# Patient Record
Sex: FEMALE | Race: WHITE | Employment: OTHER | URBAN - METROPOLITAN AREA
[De-identification: names, ages, dates, MRNs, and addresses within clinical notes are randomized per-mention and may not be internally consistent; named-entity substitution may affect disease eponyms.]

---

## 2017-10-27 ENCOUNTER — APPOINTMENT (OUTPATIENT)
Dept: CT IMAGING | Age: 82
End: 2017-10-27
Attending: EMERGENCY MEDICINE
Payer: MEDICARE

## 2017-10-27 ENCOUNTER — HOSPITAL ENCOUNTER (EMERGENCY)
Age: 82
Discharge: HOME | End: 2017-10-27
Attending: EMERGENCY MEDICINE
Payer: MEDICARE

## 2017-10-27 ENCOUNTER — APPOINTMENT (OUTPATIENT)
Dept: GENERAL RADIOLOGY | Age: 82
End: 2017-10-27
Attending: EMERGENCY MEDICINE
Payer: MEDICARE

## 2017-10-27 VITALS
HEART RATE: 70 BPM | SYSTOLIC BLOOD PRESSURE: 126 MMHG | BODY MASS INDEX: 43.92 KG/M2 | WEIGHT: 223.7 LBS | OXYGEN SATURATION: 93 % | RESPIRATION RATE: 23 BRPM | TEMPERATURE: 98.2 F | DIASTOLIC BLOOD PRESSURE: 60 MMHG | HEIGHT: 60 IN

## 2017-10-27 DIAGNOSIS — N39.0 URINARY TRACT INFECTION WITHOUT HEMATURIA, SITE UNSPECIFIED: Primary | ICD-10-CM
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
## 2017-10-27 LAB
ALBUMIN SERPL-MCNC: 2.6 G/DL (ref 3.5–5)
ALBUMIN/GLOB SERPL: 0.5 {RATIO} (ref 1.1–2.2)
ALP SERPL-CCNC: 162 U/L (ref 45–117)
ALT SERPL-CCNC: 25 U/L (ref 12–78)
ANION GAP SERPL CALC-SCNC: 8 MMOL/L (ref 5–15)
APPEARANCE UR: ABNORMAL
AST SERPL-CCNC: 25 U/L (ref 15–37)
BACTERIA URNS QL MICRO: ABNORMAL /HPF
BASOPHILS # BLD: 0 K/UL (ref 0–0.1)
BASOPHILS NFR BLD: 0 % (ref 0–1)
BILIRUB SERPL-MCNC: 0.5 MG/DL (ref 0.2–1)
BILIRUB UR QL: NEGATIVE
BUN SERPL-MCNC: 37 MG/DL (ref 6–20)
BUN/CREAT SERPL: 24 (ref 12–20)
CALCIUM SERPL-MCNC: 9 MG/DL (ref 8.5–10.1)
CHLORIDE SERPL-SCNC: 97 MMOL/L (ref 97–108)
CO2 SERPL-SCNC: 27 MMOL/L (ref 21–32)
COLOR UR: ABNORMAL
CREAT SERPL-MCNC: 1.56 MG/DL (ref 0.55–1.02)
DIFFERENTIAL METHOD BLD: ABNORMAL
EOSINOPHIL # BLD: 0 K/UL (ref 0–0.4)
EOSINOPHIL NFR BLD: 0 % (ref 0–7)
EPITH CASTS URNS QL MICRO: ABNORMAL /LPF
ERYTHROCYTE [DISTWIDTH] IN BLOOD BY AUTOMATED COUNT: 12.6 % (ref 11.5–14.5)
GLOBULIN SER CALC-MCNC: 4.9 G/DL (ref 2–4)
GLUCOSE SERPL-MCNC: 267 MG/DL (ref 65–100)
GLUCOSE UR STRIP.AUTO-MCNC: NEGATIVE MG/DL
HCT VFR BLD AUTO: 39.6 % (ref 35–47)
HGB BLD-MCNC: 13.2 G/DL (ref 11.5–16)
HGB UR QL STRIP: ABNORMAL
KETONES UR QL STRIP.AUTO: NEGATIVE MG/DL
LEUKOCYTE ESTERASE UR QL STRIP.AUTO: ABNORMAL
LYMPHOCYTES # BLD: 0.7 K/UL (ref 0.8–3.5)
LYMPHOCYTES NFR BLD: 5 % (ref 12–49)
MCH RBC QN AUTO: 31.1 PG (ref 26–34)
MCHC RBC AUTO-ENTMCNC: 33.3 G/DL (ref 30–36.5)
MCV RBC AUTO: 93.4 FL (ref 80–99)
MONOCYTES # BLD: 1.3 K/UL (ref 0–1)
MONOCYTES NFR BLD: 9 % (ref 5–13)
NEUTS SEG # BLD: 12.2 K/UL (ref 1.8–8)
NEUTS SEG NFR BLD: 86 % (ref 32–75)
NITRITE UR QL STRIP.AUTO: POSITIVE
PH UR STRIP: 5.5 [PH] (ref 5–8)
PLATELET # BLD AUTO: 176 K/UL (ref 150–400)
POTASSIUM SERPL-SCNC: 3.9 MMOL/L (ref 3.5–5.1)
PROT SERPL-MCNC: 7.5 G/DL (ref 6.4–8.2)
PROT UR STRIP-MCNC: 30 MG/DL
RBC # BLD AUTO: 4.24 M/UL (ref 3.8–5.2)
RBC #/AREA URNS HPF: ABNORMAL /HPF (ref 0–5)
RBC MORPH BLD: ABNORMAL
SODIUM SERPL-SCNC: 132 MMOL/L (ref 136–145)
SP GR UR REFRACTOMETRY: 1.01 (ref 1–1.03)
UR CULT HOLD, URHOLD: NORMAL
UROBILINOGEN UR QL STRIP.AUTO: 1 EU/DL (ref 0.2–1)
WBC # BLD AUTO: 14.2 K/UL (ref 3.6–11)
WBC URNS QL MICRO: >100 /HPF (ref 0–4)

## 2017-10-27 PROCEDURE — 85025 COMPLETE CBC W/AUTO DIFF WBC: CPT | Performed by: EMERGENCY MEDICINE

## 2017-10-27 PROCEDURE — G8980 MOBILITY D/C STATUS: HCPCS

## 2017-10-27 PROCEDURE — 80053 COMPREHEN METABOLIC PANEL: CPT | Performed by: EMERGENCY MEDICINE

## 2017-10-27 PROCEDURE — 96365 THER/PROPH/DIAG IV INF INIT: CPT

## 2017-10-27 PROCEDURE — 74011250636 HC RX REV CODE- 250/636: Performed by: EMERGENCY MEDICINE

## 2017-10-27 PROCEDURE — 36415 COLL VENOUS BLD VENIPUNCTURE: CPT | Performed by: EMERGENCY MEDICINE

## 2017-10-27 PROCEDURE — 99285 EMERGENCY DEPT VISIT HI MDM: CPT

## 2017-10-27 PROCEDURE — 71010 XR CHEST PORT: CPT

## 2017-10-27 PROCEDURE — 51701 INSERT BLADDER CATHETER: CPT

## 2017-10-27 PROCEDURE — 97116 GAIT TRAINING THERAPY: CPT

## 2017-10-27 PROCEDURE — 77030005563 HC CATH URETH INT MMGH -A

## 2017-10-27 PROCEDURE — 97161 PT EVAL LOW COMPLEX 20 MIN: CPT

## 2017-10-27 PROCEDURE — 90791 PSYCH DIAGNOSTIC EVALUATION: CPT

## 2017-10-27 PROCEDURE — 70450 CT HEAD/BRAIN W/O DYE: CPT

## 2017-10-27 PROCEDURE — G8978 MOBILITY CURRENT STATUS: HCPCS

## 2017-10-27 PROCEDURE — 74011250637 HC RX REV CODE- 250/637: Performed by: EMERGENCY MEDICINE

## 2017-10-27 PROCEDURE — G8979 MOBILITY GOAL STATUS: HCPCS

## 2017-10-27 PROCEDURE — 74011000258 HC RX REV CODE- 258: Performed by: EMERGENCY MEDICINE

## 2017-10-27 PROCEDURE — 81001 URINALYSIS AUTO W/SCOPE: CPT | Performed by: EMERGENCY MEDICINE

## 2017-10-27 RX ORDER — CEFDINIR 300 MG/1
300 CAPSULE ORAL
Qty: 7 CAP | Refills: 0 | Status: SHIPPED | OUTPATIENT
Start: 2017-10-27 | End: 2021-01-01

## 2017-10-27 RX ORDER — ACETAMINOPHEN 325 MG/1
975 TABLET ORAL
Status: COMPLETED | OUTPATIENT
Start: 2017-10-27 | End: 2017-10-27

## 2017-10-27 RX ADMIN — ACETAMINOPHEN 975 MG: 325 TABLET ORAL at 13:51

## 2017-10-27 RX ADMIN — CEFTRIAXONE 1 G: 1 INJECTION, POWDER, FOR SOLUTION INTRAMUSCULAR; INTRAVENOUS at 12:42

## 2017-10-27 NOTE — ED NOTES
This RN called the patient's PCP to verify patient's allergies.  They are going to fax the most recent office note

## 2017-10-27 NOTE — PROGRESS NOTES
physical Therapy Emergency Department EVALUATION/DISCHARGE  Patient: Stephanie Carrizales (11 y.o. female)  Date: 10/27/2017  Primary Diagnosis: There are no admission diagnoses documented for this encounter. Precautions:      ASSESSMENT :  Chart reviewed. Patient presents from home with a UTI. Monday, patient reporting that she stood up with RW after getting out of bed and felt her legs get weak. She was able to slowly lower herself to the floor. No pain or injury from event. Based on the objective data described below, the patient presents approaching functional baseline with general debility. Patient needing additional time to come to the EOB. Patient leaning posteriorly, but able to correct with UEs. Once feet on the floor, good sitting balance. Patient sit to stand, CGA with RW. Patient ambulating with a steady gait pattern out on the hallway. No weakness, buckling or pain reported. Suspect that patient is approaching functional baseline. Advised patient to stand EOB, march in place etc.. Prior to walking away from the bed to insure that she does not experience another episode of weakness. Discussed the consequences of a fall at her age. Patient and family member agree. Patient is not home alone very often and has good support and assist as needed. Recommend home with family upon discharge. Further acute physical therapy is not indicated at this time. PLAN :  Discharge Recommendations:     [x]   Home with family  []   Skilled nursing facility  []   Admission to hospital with rehab likely needed  []   Inpatient rehab referral  []   Outpatient physical therapy referral  []   Other:    Further Equipment Recommendations for Discharge: none, has RW at home  []   Rolling walker with 5\" wheels  []   Crutches   []   [address]   []   Wheelchair   []   Other:     COMMUNICATION/EDUCATION:   Communication/Collaboration:  [x]   Fall prevention education was provided and the patient/caregiver indicated understanding.   [x] Patient/family have participated as able and agree with findings and recommendations. []   Patient is unable to participate in plan of care at this time. Findings and recommendations were discussed with: MD physician and Registered Nurse       SUBJECTIVE:   Patient stated These aren't high heels! They are wedges.     OBJECTIVE DATA SUMMARY:   HISTORY:    Past Medical History:   Diagnosis Date    Diabetes ([facility] [address]     High cholesterol     Hypertension      Past Surgical History:   Procedure Laterality Date    HX HYSTERECTOMY      HX ORTHOPAEDIC      bilateral rods in femurs    HX PACEMAKER       Prior Level of Function/Home Situation: Lives with granddaughter and her  and great grandchild (23 y.o). Patient is rarely home alone. At baseline, patient ambulates within the house with RW with distant supervision. Patient fell many year ago and broke her left hip. No falls since. Personal factors and/or comorbidities impacting plan of care:     Home Situation  Home Environment: Private residence  # Steps to Enter: 0  One/Two Story Residence: One story  Living Alone: No  Support Systems: Child(brii), Family member(s)  Patient Expects to be Discharged to[de-identified] Patient room  Current DME Used/Available at Home: Walker, rolling    EXAMINATION/PRESENTATION/DECISION MAKING:   Critical Behavior:  Neurologic State: Alert, Confused  Orientation Level: Disoriented to time, Oriented to person, Oriented to place, Oriented to situation  Cognition: Appropriate for age attention/concentration, Appropriate decision making, Appropriate safety awareness, Follows commands     Hearing:   Auditory  Auditory Impairment: Hard of hearing, bilateral    Strength:    Strength: Generally decreased, functional     Tone & Sensation:   Tone: Normal  Sensation: Intact      Coordination:  Coordination: Generally decreased, functional    Functional Mobility:  Bed Mobility:  Supine to Sit: Stand-by asssistance     Transfers:  Sit to Stand: Contact guard assistance  Stand to Sit: Contact guard assistance     Balance:   Sitting: Intact  Standing: Intact; With support  Ambulation/Gait Training:  Distance (ft): 100 Feet (ft)  Assistive Device: Gait belt;Walker, rolling  Ambulation - Level of Assistance: Stand-by asssistance  Gait Abnormalities: Decreased step clearance;Trunk sway increased (forward flexion')  Base of Support: Widened  Speed/[place]: Slow  Step Length: Right shortened;Left shortened    Special Tests:  10 Meter walk test:  (Specify if any supplemental oxygen is used, the type, pre, during and post sats.)    Self-Selected Or Fast-Velocity: Self Selected Velocity  Trial 1: 14.3 Seconds  Trial 2: 14.3 Seconds  Trial 3: 14.3 Seconds      Gait speed: 0.70 m/s             Walking Speed (m/s)  Modifier Scale Age 52-63 Age 61-76 Age 66-77 Age 80-80    Male Female Male Female Male Female Male Female   CH   0% Impaired ? 1.39 ? 1.40 ? 1.36 ? 1.30 ? 1.33 ? 1.27 ? 1.21 ? 1.15   CI   1-19% Impaired 1.11-1.38 1.12-1.39 1.09-1.35 1.04-1.29 1.06-1.32 1.01-1.26 0.96-1.20 0.92-1.14   CJ   20-39% Impaired 0.83-1.10 0.84-1.11 0.82-1.08 0.78-1.03 0.80-1.05 0.76-1.00 0.72-0.95 0.69-0.91   CK   40-59% Impaired 0.56-0.82 0.57-0.83 0.54-0.81 0.52-0.77 0.53-0.79 0.51-0.75 0.48-0.71 0.46-0.68   CL   60-79% Impaired 0.28-0.55 0.28-0.56 0.27-0.53 0.26-0.51 0.27-0.52 0.25-0.50 0.24-0.49 0.23-0.45   CM   80-99% Impaired 0.01-0.28 < 0.01-0.28 < 0.01-0.27 < 0.01-0.26 0.01-0.27 0.01-0.24 0.01-0.23 0.01-0.22   CN   100% Impaired Cannot Perform   Minimal Detectable Change (MDC-90) = 0.1 m/s  Shoaib HENDRICKSON \"Comfortable and maximum walking speed of adults aged 20-79 years: reference values and determinants. \" Age and Agin Volume 26(1):15-9. Benigno Diaz. \"Age- and gender-related test performance in community-dwelling elderly people: Six-Minute Walk Test, [place] Balance Scale, Timed Up & Go Test, and gait speeds. \" Physical Therapy: 2002 Volume 82(2):128-37. Toni Leung DM, Dee PW, Bailey Espinosa NICCI, [facility] CONSTANTINE. \"Assessing stability and change of four performance measures: a longitudinal study evaluating outcome following total hip and knee arthroplasty. \" [facility] Musculoskeletal Disorders: 2005 Volume 6(3). Sachin Reyes, PhD; Dale Delgadillo, PhD. Marysol Fitch Paper: \"Walking Speed: the Sixth Vital Sign\" Journal of Geriatric Physical Therapy: 2009 - Volume 32 - Issue 2 - p 25 . In compliance with CMSs Claims Based Outcome Reporting, the following G-code set was chosen for this patient based on their primary functional limitation being treated: The outcome measure chosen to determine the severity of the functional limitation was the 10 MWT with a score of 0.70 m/s which was correlated with the impairment scale. ? Mobility - Walking and Moving Around:     - CURRENT STATUS: CJ - 20%-39% impaired, limited or restricted    - GOAL STATUS: CJ - 20%-39% impaired, limited or restricted    - D/C STATUS:  CJ - 20%-39% impaired, limited or restricted        Pain:Pain Scale 1: Numeric (0 - 10)  Pain Intensity 1: 0     Activity Tolerance:   WFL, minimal RUSSELL with activity. Vitals stable. O2>90% on RA  Please refer to the flowsheet for vital signs taken during this treatment.   After treatment:   []         Patient left in no apparent distress sitting up in chair  [x]         Patient left in no apparent distress in bed  [x]         Call bell left within reach  [x]         Nursing notified  [x]         Caregiver present  []         Bed alarm activated        Thank you for this referral.  Merlene Frazier PT, DPT   Time Calculation: 21 mins

## 2017-10-27 NOTE — ED PROVIDER NOTES
HPI Comments: 80 y.o. female with past medical history significant for DM, HTN, hypercholesterolemia, and s/p PM and hysterectomy who presents from home via private vehicle with chief complaint of urinary frequency. Pt lives with her granddaughter who notes that the pt slid to the ground from bed when trying to transfer to her walker 3 days ago. Pt reports that she was stuck on the floor for four hours unable to call to her family before she was found and assisted up. Pt denies any pain or injury from the fall and did not hit her head, but does note that she is having increased difficulty walking. Granddaughter notes that the pt's speech was somewhat slurred after the fall but seemed to improve through Wednesday as she talked more. Pt reports some SOB when she \"does too much\" - which the granddaughter notes can be just walking from the bed to the bathroom. Pt c/o increased urinary frequency but denies diarrhea. Lastly, she notes some chronic numbness of her left hand. +Leg swelling. Outside of the room, granddaughter notes that the pt \"seems more alert than she is\" and \"is a good actress. \" Felicia Villafana has not h/o stroke. There are no other acute medical concerns at this time. Social hx: Never smoker. No alcohol use. Note written by Brianna Montes, as dictated by Adela Vences MD 10:09 AM      The history is provided by the patient. No  was used. Past Medical History:   Diagnosis Date    Diabetes ([address]     High cholesterol     Hypertension        Past Surgical History:   Procedure Laterality Date    HX HYSTERECTOMY      HX ORTHOPAEDIC      bilateral rods in femurs    HX PACEMAKER           No family history on file. Social History     Social History    Marital status:      Spouse name: N/A    Number of children: N/A    Years of education: N/A     Occupational History    Not on file.      Social History Main Topics    Smoking status: Never Smoker    Smokeless tobacco: Never Used    Alcohol use No    Drug use: Not on file    Sexual activity: Not on file     Other Topics Concern    Not on file     Social History Narrative    No narrative on file         ALLERGIES: Hydrocodone    Review of Systems   Constitutional: Negative for activity change, appetite change and fatigue. HENT: Negative for ear pain, facial swelling, sore throat and trouble swallowing. Eyes: Negative for pain, discharge and visual disturbance. Respiratory: Negative for chest tightness, shortness of breath and wheezing. Cardiovascular: Positive for leg swelling. Negative for chest pain and palpitations. Gastrointestinal: Negative for abdominal pain, blood in stool and nausea. Genitourinary: Positive for frequency. Negative for difficulty urinating, dysuria, flank pain and hematuria. Musculoskeletal: Positive for gait problem. Negative for arthralgias, joint swelling, myalgias and neck pain. Skin: Negative for color change and rash. Neurological: Positive for speech difficulty (slurred3 days ago. ). Negative for weakness, numbness and headaches. Hematological: Negative for adenopathy. Does not bruise/bleed easily. Psychiatric/Behavioral: Positive for confusion. Negative for behavioral problems and sleep disturbance. All other systems reviewed and are negative. Vitals:    10/27/17 0929 10/27/17 0945   BP: 112/81    Pulse: 67    Resp: 21    SpO2: 96% 93%   Weight: 101.5 kg (223 lb 11.2 oz)    Height: 5' (1.524 m)             Physical Exam   Constitutional: She is oriented to person, place, and time. She appears well-developed. No distress. Obese   HENT:   Head: Normocephalic and atraumatic. Nose: Nose normal.   Mouth/Throat: Oropharynx is clear and moist.   Eyes: Conjunctivae and EOM are normal. Pupils are equal, round, and reactive to light. No scleral icterus. Neck: Normal range of motion. Neck supple. No JVD present. No tracheal deviation present.  No thyromegaly present. No carotid bruits noted. Cardiovascular: Normal rate, regular rhythm, normal heart sounds and intact distal pulses. Exam reveals no gallop and no friction rub. No murmur heard. Pulmonary/Chest: Effort normal and breath sounds normal. No respiratory distress. She has no wheezes. She has no rales. She exhibits no tenderness. Abdominal: Soft. Bowel sounds are normal. She exhibits no distension and no mass. There is no tenderness. There is no rebound and no guarding. Musculoskeletal: Normal range of motion. She exhibits no edema or tenderness. Lymphadenopathy:     She has no cervical adenopathy. Neurological: She is alert and oriented to person, place, and time. She has normal reflexes. No cranial nerve deficit. Coordination normal.   Skin: Skin is warm and dry. No rash noted. No erythema. Psychiatric: She has a normal mood and affect. Her behavior is normal. Judgment and thought content normal.   Nursing note and vitals reviewed.      Note written by Brianna Camara, as dictated by Alvin Boyd MD 10:11 AM      MDM  Number of Diagnoses or Management Options  Urinary tract infection without hematuria, site unspecified: new and requires workup     Amount and/or Complexity of Data Reviewed  Clinical lab tests: ordered and reviewed  Tests in the radiology section of CPT®: ordered and reviewed  Decide to obtain previous medical records or to obtain history from someone other than the patient: yes  Obtain history from someone other than the patient: yes  Review and summarize past medical records: yes  Discuss the patient with other providers: yes  Independent visualization of images, tracings, or specimens: yes    Risk of Complications, Morbidity, and/or Mortality  Presenting problems: high  Diagnostic procedures: high  Management options: high    Patient Progress  Patient progress: stable    ED Course       Procedures      PROGRESS NOTE:  11:58 AM  Urinalysis is positive for signs of UTI.     12:12 PM  Rakan Taveras MD had pharmacy look at dosing ABx. Will give omnicef 300 mg once daily. 12:19 PM  Patient's results have been reviewed with them. Patient and/or family have verbally conveyed their understanding and agreement of the patient's signs, symptoms, diagnosis, treatment and prognosis and additionally agree to follow up as recommended or return to the Emergency Room should their condition change prior to follow-up. Discharge instructions have also been provided to the patient with some educational information regarding their diagnosis as well a list of reasons why they would want to return to the ER prior to their follow-up appointment should their condition change. Pt will be discharged when her treatment is complete.  Believe patient's current symptoms are related to her UTI

## 2017-10-27 NOTE — ED NOTES
Spoke with Dr. Sandra Bran about patient's temperature and he gave verbal orders to administer Tylenol prior to discharge

## 2017-10-27 NOTE — Clinical Note
Home to rest and stay well hydrated. Medications as directed for the infection and follow up with own MD in the next 3-4 days if continued difficulty or no improvement/worsening of symptoms.

## 2017-10-27 NOTE — ED TRIAGE NOTES
CARLOS A NOTE: Per family, patient has not been feeling well since Tuesday. Family suspicious of UTI due to increase confusion. Family continues to report that patient slipped out of bed Tuesday and experienced slurred speech for a short period of time and dizziness and vomiting. Glucose was high (178) and BP was low (76/48) at that time also.

## 2017-10-27 NOTE — ED NOTES
I have reviewed discharge instructions with the patient and caregiver. The patient and caregiver verbalized understanding. Pt wheeled out of the dept via wheelchair.

## 2021-01-01 ENCOUNTER — APPOINTMENT (OUTPATIENT)
Dept: CT IMAGING | Age: 86
DRG: 637 | End: 2021-01-01
Attending: EMERGENCY MEDICINE
Payer: MEDICARE

## 2021-01-01 ENCOUNTER — APPOINTMENT (OUTPATIENT)
Dept: NON INVASIVE DIAGNOSTICS | Age: 86
DRG: 637 | End: 2021-01-01
Attending: INTERNAL MEDICINE
Payer: MEDICARE

## 2021-01-01 ENCOUNTER — APPOINTMENT (OUTPATIENT)
Dept: GENERAL RADIOLOGY | Age: 86
DRG: 637 | End: 2021-01-01
Attending: EMERGENCY MEDICINE
Payer: MEDICARE

## 2021-01-01 ENCOUNTER — HOSPITAL ENCOUNTER (INPATIENT)
Age: 86
LOS: 2 days | Discharge: HOME HEALTH | DRG: 637 | End: 2021-12-10
Attending: EMERGENCY MEDICINE | Admitting: FAMILY MEDICINE
Payer: MEDICARE

## 2021-01-01 VITALS
SYSTOLIC BLOOD PRESSURE: 129 MMHG | WEIGHT: 219.36 LBS | BODY MASS INDEX: 44.22 KG/M2 | TEMPERATURE: 97.7 F | HEIGHT: 59 IN | OXYGEN SATURATION: 97 % | HEART RATE: 63 BPM | DIASTOLIC BLOOD PRESSURE: 59 MMHG | RESPIRATION RATE: 18 BRPM

## 2021-01-01 DIAGNOSIS — I63.9 CEREBROVASCULAR ACCIDENT (CVA), UNSPECIFIED MECHANISM (HCC): Primary | ICD-10-CM

## 2021-01-01 DIAGNOSIS — Q67.0 FACIAL ASYMMETRY: ICD-10-CM

## 2021-01-01 DIAGNOSIS — D72.829 LEUKOCYTOSIS, UNSPECIFIED TYPE: ICD-10-CM

## 2021-01-01 DIAGNOSIS — R41.4 LEFT-SIDED NEGLECT: ICD-10-CM

## 2021-01-01 DIAGNOSIS — R11.10 VOMITING, INTRACTABILITY OF VOMITING NOT SPECIFIED, PRESENCE OF NAUSEA NOT SPECIFIED, UNSPECIFIED VOMITING TYPE: ICD-10-CM

## 2021-01-01 DIAGNOSIS — E16.2 HYPOGLYCEMIA: ICD-10-CM

## 2021-01-01 LAB
ALBUMIN SERPL-MCNC: 3.6 G/DL (ref 3.5–5)
ALBUMIN/GLOB SERPL: 0.7 {RATIO} (ref 1.1–2.2)
ALP SERPL-CCNC: 91 U/L (ref 45–117)
ALT SERPL-CCNC: 48 U/L (ref 12–78)
ANION GAP SERPL CALC-SCNC: 3 MMOL/L (ref 5–15)
ANION GAP SERPL CALC-SCNC: 3 MMOL/L (ref 5–15)
APPEARANCE UR: ABNORMAL
AST SERPL-CCNC: 51 U/L (ref 15–37)
ATRIAL RATE: 87 BPM
BACTERIA SPEC CULT: ABNORMAL
BACTERIA URNS QL MICRO: ABNORMAL /HPF
BASOPHILS # BLD: 0 K/UL (ref 0–0.1)
BASOPHILS NFR BLD: 0 % (ref 0–1)
BILIRUB SERPL-MCNC: 0.3 MG/DL (ref 0.2–1)
BILIRUB UR QL: NEGATIVE
BNP SERPL-MCNC: 4781 PG/ML
BUN SERPL-MCNC: 26 MG/DL (ref 6–20)
BUN SERPL-MCNC: 26 MG/DL (ref 6–20)
BUN/CREAT SERPL: 22 (ref 12–20)
BUN/CREAT SERPL: 22 (ref 12–20)
CALCIUM SERPL-MCNC: 10 MG/DL (ref 8.5–10.1)
CALCIUM SERPL-MCNC: 9.4 MG/DL (ref 8.5–10.1)
CALCULATED P AXIS, ECG09: 29 DEGREES
CALCULATED R AXIS, ECG10: 57 DEGREES
CALCULATED T AXIS, ECG11: -144 DEGREES
CC UR VC: ABNORMAL
CHLORIDE SERPL-SCNC: 105 MMOL/L (ref 97–108)
CHLORIDE SERPL-SCNC: 107 MMOL/L (ref 97–108)
CHOLEST SERPL-MCNC: 166 MG/DL
CO2 SERPL-SCNC: 25 MMOL/L (ref 21–32)
CO2 SERPL-SCNC: 29 MMOL/L (ref 21–32)
COLOR UR: ABNORMAL
COMMENT, HOLDF: NORMAL
COVID-19 RAPID TEST, COVR: NOT DETECTED
CREAT SERPL-MCNC: 1.16 MG/DL (ref 0.55–1.02)
CREAT SERPL-MCNC: 1.2 MG/DL (ref 0.55–1.02)
CRP SERPL HS-MCNC: >9.5 MG/L
DIAGNOSIS, 93000: NORMAL
DIFFERENTIAL METHOD BLD: ABNORMAL
ECHO AV AREA PEAK VELOCITY: 0.96 CM2
ECHO AV AREA VTI: 1.15 CM2
ECHO AV AREA/BSA PEAK VELOCITY: 0.5 CM2/M2
ECHO AV AREA/BSA VTI: 0.6 CM2/M2
ECHO AV MEAN GRADIENT: 22.02 MMHG
ECHO AV PEAK GRADIENT: 44.48 MMHG
ECHO AV PEAK VELOCITY: 333.48 CM/S
ECHO AV VTI: 56.01 CM
ECHO LVOT DIAM: 1.87 CM
ECHO LVOT PEAK GRADIENT: 5.43 MMHG
ECHO LVOT PEAK VELOCITY: 116.5 CM/S
ECHO LVOT SV: 64.6 ML
ECHO LVOT VTI: 23.53 CM
ECHO MV A VELOCITY: 83.28 CM/S
ECHO MV AREA PHT: 3.39 CM2
ECHO MV AREA VTI: 2.74 CM2
ECHO MV MAX VELOCITY: 100.03 CM/S
ECHO MV MEAN GRADIENT: 1.73 MMHG
ECHO MV PEAK GRADIENT: 4 MMHG
ECHO MV PRESSURE HALF TIME (PHT): 64.88 MS
ECHO MV VTI: 23.56 CM
EOSINOPHIL # BLD: 0 K/UL (ref 0–0.4)
EOSINOPHIL NFR BLD: 0 % (ref 0–7)
EPITH CASTS URNS QL MICRO: ABNORMAL /LPF
ERYTHROCYTE [DISTWIDTH] IN BLOOD BY AUTOMATED COUNT: 12.6 % (ref 11.5–14.5)
ERYTHROCYTE [DISTWIDTH] IN BLOOD BY AUTOMATED COUNT: 13 % (ref 11.5–14.5)
ERYTHROCYTE [SEDIMENTATION RATE] IN BLOOD: 11 MM/HR (ref 0–30)
EST. AVERAGE GLUCOSE BLD GHB EST-MCNC: 123 MG/DL
EST. AVERAGE GLUCOSE BLD GHB EST-MCNC: 131 MG/DL
GLOBULIN SER CALC-MCNC: 4.9 G/DL (ref 2–4)
GLUCOSE BLD STRIP.AUTO-MCNC: 101 MG/DL (ref 65–117)
GLUCOSE BLD STRIP.AUTO-MCNC: 116 MG/DL (ref 65–117)
GLUCOSE BLD STRIP.AUTO-MCNC: 141 MG/DL (ref 65–117)
GLUCOSE BLD STRIP.AUTO-MCNC: 143 MG/DL (ref 65–117)
GLUCOSE BLD STRIP.AUTO-MCNC: 154 MG/DL (ref 65–117)
GLUCOSE BLD STRIP.AUTO-MCNC: 159 MG/DL (ref 65–117)
GLUCOSE BLD STRIP.AUTO-MCNC: 162 MG/DL (ref 65–117)
GLUCOSE BLD STRIP.AUTO-MCNC: 191 MG/DL (ref 65–117)
GLUCOSE BLD STRIP.AUTO-MCNC: 203 MG/DL (ref 65–117)
GLUCOSE BLD STRIP.AUTO-MCNC: 36 MG/DL (ref 65–117)
GLUCOSE BLD STRIP.AUTO-MCNC: 49 MG/DL (ref 65–117)
GLUCOSE BLD STRIP.AUTO-MCNC: 64 MG/DL (ref 65–117)
GLUCOSE BLD STRIP.AUTO-MCNC: 75 MG/DL (ref 65–117)
GLUCOSE BLD STRIP.AUTO-MCNC: 80 MG/DL (ref 65–117)
GLUCOSE BLD STRIP.AUTO-MCNC: 85 MG/DL (ref 65–117)
GLUCOSE BLD STRIP.AUTO-MCNC: 87 MG/DL (ref 65–117)
GLUCOSE SERPL-MCNC: 72 MG/DL (ref 65–100)
GLUCOSE SERPL-MCNC: 89 MG/DL (ref 65–100)
GLUCOSE UR STRIP.AUTO-MCNC: 250 MG/DL
HBA1C MFR BLD: 5.9 % (ref 4–5.6)
HBA1C MFR BLD: 6.2 % (ref 4–5.6)
HCT VFR BLD AUTO: 44.4 % (ref 35–47)
HCT VFR BLD AUTO: 55.1 % (ref 35–47)
HDLC SERPL-MCNC: 77 MG/DL
HDLC SERPL: 2.2 {RATIO} (ref 0–5)
HGB BLD-MCNC: 14.5 G/DL (ref 11.5–16)
HGB BLD-MCNC: 18.1 G/DL (ref 11.5–16)
HGB UR QL STRIP: ABNORMAL
HYALINE CASTS URNS QL MICRO: ABNORMAL /LPF (ref 0–5)
IMM GRANULOCYTES # BLD AUTO: 0.1 K/UL (ref 0–0.04)
IMM GRANULOCYTES NFR BLD AUTO: 1 % (ref 0–0.5)
INR PPP: 1 (ref 0.9–1.1)
KETONES UR QL STRIP.AUTO: NEGATIVE MG/DL
LDLC SERPL CALC-MCNC: 67.2 MG/DL (ref 0–100)
LEUKOCYTE ESTERASE UR QL STRIP.AUTO: NEGATIVE
LYMPHOCYTES # BLD: 1.1 K/UL (ref 0.8–3.5)
LYMPHOCYTES NFR BLD: 6 % (ref 12–49)
MCH RBC QN AUTO: 31.7 PG (ref 26–34)
MCH RBC QN AUTO: 31.8 PG (ref 26–34)
MCHC RBC AUTO-ENTMCNC: 32.7 G/DL (ref 30–36.5)
MCHC RBC AUTO-ENTMCNC: 32.8 G/DL (ref 30–36.5)
MCV RBC AUTO: 96.7 FL (ref 80–99)
MCV RBC AUTO: 96.9 FL (ref 80–99)
MONOCYTES # BLD: 0.9 K/UL (ref 0–1)
MONOCYTES NFR BLD: 5 % (ref 5–13)
NEUTS SEG # BLD: 16 K/UL (ref 1.8–8)
NEUTS SEG NFR BLD: 88 % (ref 32–75)
NITRITE UR QL STRIP.AUTO: NEGATIVE
NRBC # BLD: 0 K/UL (ref 0–0.01)
NRBC # BLD: 0 K/UL (ref 0–0.01)
NRBC BLD-RTO: 0 PER 100 WBC
NRBC BLD-RTO: 0 PER 100 WBC
P-R INTERVAL, ECG05: 258 MS
PH UR STRIP: 7 [PH] (ref 5–8)
PLATELET # BLD AUTO: 168 K/UL (ref 150–400)
PLATELET # BLD AUTO: 225 K/UL (ref 150–400)
PMV BLD AUTO: 10.5 FL (ref 8.9–12.9)
PMV BLD AUTO: 10.9 FL (ref 8.9–12.9)
POTASSIUM SERPL-SCNC: 3.6 MMOL/L (ref 3.5–5.1)
POTASSIUM SERPL-SCNC: ABNORMAL MMOL/L (ref 3.5–5.1)
PROT SERPL-MCNC: 8.5 G/DL (ref 6.4–8.2)
PROT UR STRIP-MCNC: >300 MG/DL
PROTHROMBIN TIME: 10.9 SEC (ref 9–11.1)
Q-T INTERVAL, ECG07: 388 MS
QRS DURATION, ECG06: 84 MS
QTC CALCULATION (BEZET), ECG08: 466 MS
RBC # BLD AUTO: 4.58 M/UL (ref 3.8–5.2)
RBC # BLD AUTO: 5.7 M/UL (ref 3.8–5.2)
RBC #/AREA URNS HPF: ABNORMAL /HPF (ref 0–5)
SAMPLES BEING HELD,HOLD: NORMAL
SERVICE CMNT-IMP: ABNORMAL
SERVICE CMNT-IMP: NORMAL
SODIUM SERPL-SCNC: 135 MMOL/L (ref 136–145)
SODIUM SERPL-SCNC: 137 MMOL/L (ref 136–145)
SOURCE, COVRS: NORMAL
SP GR UR REFRACTOMETRY: 1.01
TRIGL SERPL-MCNC: 109 MG/DL (ref ?–150)
TROPONIN-HIGH SENSITIVITY: 408 NG/L (ref 0–51)
TROPONIN-HIGH SENSITIVITY: 477 NG/L (ref 0–51)
TROPONIN-HIGH SENSITIVITY: 759 NG/L (ref 0–51)
TSH SERPL DL<=0.05 MIU/L-ACNC: 0.7 UIU/ML (ref 0.36–3.74)
UR CULT HOLD, URHOLD: NORMAL
UROBILINOGEN UR QL STRIP.AUTO: 0.2 EU/DL (ref 0.2–1)
VENTRICULAR RATE, ECG03: 87 BPM
VLDLC SERPL CALC-MCNC: 21.8 MG/DL
WBC # BLD AUTO: 17.6 K/UL (ref 3.6–11)
WBC # BLD AUTO: 18.2 K/UL (ref 3.6–11)
WBC URNS QL MICRO: ABNORMAL /HPF (ref 0–4)

## 2021-01-01 PROCEDURE — 92610 EVALUATE SWALLOWING FUNCTION: CPT | Performed by: SPEECH-LANGUAGE PATHOLOGIST

## 2021-01-01 PROCEDURE — 80048 BASIC METABOLIC PNL TOTAL CA: CPT

## 2021-01-01 PROCEDURE — 95816 EEG AWAKE AND DROWSY: CPT | Performed by: PSYCHIATRY & NEUROLOGY

## 2021-01-01 PROCEDURE — 97161 PT EVAL LOW COMPLEX 20 MIN: CPT

## 2021-01-01 PROCEDURE — 99285 EMERGENCY DEPT VISIT HI MDM: CPT

## 2021-01-01 PROCEDURE — 90686 IIV4 VACC NO PRSV 0.5 ML IM: CPT | Performed by: HOSPITALIST

## 2021-01-01 PROCEDURE — 97530 THERAPEUTIC ACTIVITIES: CPT

## 2021-01-01 PROCEDURE — 87635 SARS-COV-2 COVID-19 AMP PRB: CPT

## 2021-01-01 PROCEDURE — 74011250636 HC RX REV CODE- 250/636: Performed by: EMERGENCY MEDICINE

## 2021-01-01 PROCEDURE — 70498 CT ANGIOGRAPHY NECK: CPT

## 2021-01-01 PROCEDURE — 74011250637 HC RX REV CODE- 250/637: Performed by: HOSPITALIST

## 2021-01-01 PROCEDURE — 87186 SC STD MICRODIL/AGAR DIL: CPT

## 2021-01-01 PROCEDURE — 74011250636 HC RX REV CODE- 250/636: Performed by: HOSPITALIST

## 2021-01-01 PROCEDURE — 85027 COMPLETE CBC AUTOMATED: CPT

## 2021-01-01 PROCEDURE — 95816 EEG AWAKE AND DROWSY: CPT | Performed by: FAMILY MEDICINE

## 2021-01-01 PROCEDURE — 93306 TTE W/DOPPLER COMPLETE: CPT

## 2021-01-01 PROCEDURE — 96374 THER/PROPH/DIAG INJ IV PUSH: CPT

## 2021-01-01 PROCEDURE — 74011000250 HC RX REV CODE- 250: Performed by: FAMILY MEDICINE

## 2021-01-01 PROCEDURE — 90471 IMMUNIZATION ADMIN: CPT

## 2021-01-01 PROCEDURE — 74176 CT ABD & PELVIS W/O CONTRAST: CPT

## 2021-01-01 PROCEDURE — 80061 LIPID PANEL: CPT

## 2021-01-01 PROCEDURE — 74011000250 HC RX REV CODE- 250

## 2021-01-01 PROCEDURE — 70450 CT HEAD/BRAIN W/O DYE: CPT

## 2021-01-01 PROCEDURE — 96375 TX/PRO/DX INJ NEW DRUG ADDON: CPT

## 2021-01-01 PROCEDURE — 94762 N-INVAS EAR/PLS OXIMTRY CONT: CPT

## 2021-01-01 PROCEDURE — 87077 CULTURE AEROBIC IDENTIFY: CPT

## 2021-01-01 PROCEDURE — 74011250637 HC RX REV CODE- 250/637: Performed by: FAMILY MEDICINE

## 2021-01-01 PROCEDURE — 99223 1ST HOSP IP/OBS HIGH 75: CPT | Performed by: PSYCHIATRY & NEUROLOGY

## 2021-01-01 PROCEDURE — 82962 GLUCOSE BLOOD TEST: CPT

## 2021-01-01 PROCEDURE — 83036 HEMOGLOBIN GLYCOSYLATED A1C: CPT

## 2021-01-01 PROCEDURE — 93005 ELECTROCARDIOGRAM TRACING: CPT

## 2021-01-01 PROCEDURE — 97165 OT EVAL LOW COMPLEX 30 MIN: CPT

## 2021-01-01 PROCEDURE — 36415 COLL VENOUS BLD VENIPUNCTURE: CPT

## 2021-01-01 PROCEDURE — 74011250636 HC RX REV CODE- 250/636: Performed by: FAMILY MEDICINE

## 2021-01-01 PROCEDURE — 85610 PROTHROMBIN TIME: CPT

## 2021-01-01 PROCEDURE — 74011000258 HC RX REV CODE- 258: Performed by: FAMILY MEDICINE

## 2021-01-01 PROCEDURE — 71045 X-RAY EXAM CHEST 1 VIEW: CPT

## 2021-01-01 PROCEDURE — 84484 ASSAY OF TROPONIN QUANT: CPT

## 2021-01-01 PROCEDURE — 65660000000 HC RM CCU STEPDOWN

## 2021-01-01 PROCEDURE — 87086 URINE CULTURE/COLONY COUNT: CPT

## 2021-01-01 PROCEDURE — 84443 ASSAY THYROID STIM HORMONE: CPT

## 2021-01-01 PROCEDURE — 77030038269 HC DRN EXT URIN PURWCK BARD -A

## 2021-01-01 PROCEDURE — 86141 C-REACTIVE PROTEIN HS: CPT

## 2021-01-01 PROCEDURE — 0042T CT CODE NEURO PERF W CBF: CPT

## 2021-01-01 PROCEDURE — 83880 ASSAY OF NATRIURETIC PEPTIDE: CPT

## 2021-01-01 PROCEDURE — 85025 COMPLETE CBC W/AUTO DIFF WBC: CPT

## 2021-01-01 PROCEDURE — 4A03X5D MEASUREMENT OF ARTERIAL FLOW, INTRACRANIAL, EXTERNAL APPROACH: ICD-10-PCS | Performed by: HOSPITALIST

## 2021-01-01 PROCEDURE — 81001 URINALYSIS AUTO W/SCOPE: CPT

## 2021-01-01 PROCEDURE — 74011000636 HC RX REV CODE- 636: Performed by: RADIOLOGY

## 2021-01-01 PROCEDURE — 85652 RBC SED RATE AUTOMATED: CPT

## 2021-01-01 PROCEDURE — 80053 COMPREHEN METABOLIC PANEL: CPT

## 2021-01-01 RX ORDER — ONDANSETRON 2 MG/ML
4 INJECTION INTRAMUSCULAR; INTRAVENOUS ONCE
Status: ACTIVE | OUTPATIENT
Start: 2021-01-01 | End: 2021-01-01

## 2021-01-01 RX ORDER — DEXTROMETHORPHAN POLISTIREX 30 MG/5 ML
SUSPENSION, EXTENDED RELEASE 12 HR ORAL
Status: DISCONTINUED | OUTPATIENT
Start: 2021-01-01 | End: 2021-01-01

## 2021-01-01 RX ORDER — SOTALOL HYDROCHLORIDE 80 MG/1
40 TABLET ORAL DAILY
COMMUNITY

## 2021-01-01 RX ORDER — ASPIRIN 81 MG/1
81 TABLET ORAL DAILY
Qty: 30 TABLET | Refills: 11 | Status: SHIPPED | OUTPATIENT
Start: 2021-01-01

## 2021-01-01 RX ORDER — ASPIRIN 81 MG/1
81 TABLET ORAL DAILY
Status: DISCONTINUED | OUTPATIENT
Start: 2021-01-01 | End: 2021-01-01 | Stop reason: HOSPADM

## 2021-01-01 RX ORDER — INSULIN ASPART 100 [IU]/ML
INJECTION, SOLUTION INTRAVENOUS; SUBCUTANEOUS
COMMUNITY

## 2021-01-01 RX ORDER — ACETAMINOPHEN 325 MG/1
650 TABLET ORAL
Status: DISCONTINUED | OUTPATIENT
Start: 2021-01-01 | End: 2021-01-01 | Stop reason: HOSPADM

## 2021-01-01 RX ORDER — LISINOPRIL 20 MG/1
40 TABLET ORAL DAILY
Status: DISCONTINUED | OUTPATIENT
Start: 2021-01-01 | End: 2021-01-01 | Stop reason: HOSPADM

## 2021-01-01 RX ORDER — ONDANSETRON 2 MG/ML
4 INJECTION INTRAMUSCULAR; INTRAVENOUS
Status: DISCONTINUED | OUTPATIENT
Start: 2021-01-01 | End: 2021-01-01 | Stop reason: HOSPADM

## 2021-01-01 RX ORDER — INSULIN LISPRO 100 [IU]/ML
INJECTION, SOLUTION INTRAVENOUS; SUBCUTANEOUS EVERY 6 HOURS
Status: DISCONTINUED | OUTPATIENT
Start: 2021-01-01 | End: 2021-01-01

## 2021-01-01 RX ORDER — INSULIN GLARGINE 100 [IU]/ML
30 INJECTION, SOLUTION SUBCUTANEOUS DAILY
COMMUNITY

## 2021-01-01 RX ORDER — DEXTROSE MONOHYDRATE AND SODIUM CHLORIDE 5; .9 G/100ML; G/100ML
75 INJECTION, SOLUTION INTRAVENOUS CONTINUOUS
Status: DISCONTINUED | OUTPATIENT
Start: 2021-01-01 | End: 2021-01-01 | Stop reason: HOSPADM

## 2021-01-01 RX ORDER — MAGNESIUM SULFATE 100 %
4 CRYSTALS MISCELLANEOUS AS NEEDED
Status: DISCONTINUED | OUTPATIENT
Start: 2021-01-01 | End: 2021-01-01 | Stop reason: HOSPADM

## 2021-01-01 RX ORDER — ACETAMINOPHEN 650 MG/1
650 SUPPOSITORY RECTAL
Status: DISCONTINUED | OUTPATIENT
Start: 2021-01-01 | End: 2021-01-01 | Stop reason: HOSPADM

## 2021-01-01 RX ORDER — DEXTROSE 50 % IN WATER (D50W) INTRAVENOUS SYRINGE
Status: COMPLETED
Start: 2021-01-01 | End: 2021-01-01

## 2021-01-01 RX ORDER — POTASSIUM CHLORIDE 600 MG/1
8 CAPSULE, EXTENDED RELEASE ORAL 2 TIMES DAILY
COMMUNITY

## 2021-01-01 RX ORDER — ONDANSETRON 2 MG/ML
4 INJECTION INTRAMUSCULAR; INTRAVENOUS
Status: COMPLETED | OUTPATIENT
Start: 2021-01-01 | End: 2021-01-01

## 2021-01-01 RX ORDER — ATORVASTATIN CALCIUM 40 MG/1
80 TABLET, FILM COATED ORAL
Status: DISCONTINUED | OUTPATIENT
Start: 2021-01-01 | End: 2021-01-01 | Stop reason: HOSPADM

## 2021-01-01 RX ORDER — AMOXICILLIN AND CLAVULANATE POTASSIUM 875; 125 MG/1; MG/1
1 TABLET, FILM COATED ORAL 2 TIMES DAILY
Qty: 10 TABLET | Refills: 0 | Status: SHIPPED | OUTPATIENT
Start: 2021-01-01

## 2021-01-01 RX ORDER — SOTALOL HYDROCHLORIDE 80 MG/1
40 TABLET ORAL DAILY
Status: DISCONTINUED | OUTPATIENT
Start: 2021-01-01 | End: 2021-01-01 | Stop reason: HOSPADM

## 2021-01-01 RX ORDER — PUMPKIN SEED EXTRACT/SOY GERM 300 MG
CAPSULE ORAL DAILY
COMMUNITY

## 2021-01-01 RX ORDER — DEXTROSE 50 % IN WATER (D50W) INTRAVENOUS SYRINGE
50
Status: COMPLETED | OUTPATIENT
Start: 2021-01-01 | End: 2021-01-01

## 2021-01-01 RX ORDER — ASPIRIN 300 MG/1
150 SUPPOSITORY RECTAL DAILY
Status: DISCONTINUED | OUTPATIENT
Start: 2021-01-01 | End: 2021-01-01

## 2021-01-01 RX ORDER — DEXTROSE 50 % IN WATER (D50W) INTRAVENOUS SYRINGE
25-50 AS NEEDED
Status: DISCONTINUED | OUTPATIENT
Start: 2021-01-01 | End: 2021-01-01 | Stop reason: HOSPADM

## 2021-01-01 RX ORDER — RAMIPRIL 10 MG/1
10 CAPSULE ORAL DAILY
COMMUNITY

## 2021-01-01 RX ORDER — SODIUM CHLORIDE 9 MG/ML
75 INJECTION, SOLUTION INTRAVENOUS CONTINUOUS
Status: DISCONTINUED | OUTPATIENT
Start: 2021-01-01 | End: 2021-01-01

## 2021-01-01 RX ADMIN — LISINOPRIL 40 MG: 20 TABLET ORAL at 09:56

## 2021-01-01 RX ADMIN — FAMOTIDINE 20 MG: 10 INJECTION, SOLUTION INTRAVENOUS at 16:59

## 2021-01-01 RX ADMIN — DEXTROSE AND SODIUM CHLORIDE 75 ML/HR: 5; 900 INJECTION, SOLUTION INTRAVENOUS at 16:52

## 2021-01-01 RX ADMIN — DEXTROSE MONOHYDRATE 25 G: 25 INJECTION, SOLUTION INTRAVENOUS at 09:30

## 2021-01-01 RX ADMIN — SODIUM CHLORIDE 1 G: 9 INJECTION INTRAMUSCULAR; INTRAVENOUS; SUBCUTANEOUS at 16:08

## 2021-01-01 RX ADMIN — GLUCAGON HYDROCHLORIDE 1 MG: 1 INJECTION, POWDER, FOR SOLUTION INTRAMUSCULAR; INTRAVENOUS; SUBCUTANEOUS at 17:00

## 2021-01-01 RX ADMIN — ONDANSETRON 4 MG: 2 INJECTION INTRAMUSCULAR; INTRAVENOUS at 09:55

## 2021-01-01 RX ADMIN — INFLUENZA VIRUS VACCINE 0.5 ML: 15; 15; 15; 15 SUSPENSION INTRAMUSCULAR at 18:49

## 2021-01-01 RX ADMIN — ATORVASTATIN CALCIUM 80 MG: 40 TABLET, FILM COATED ORAL at 22:19

## 2021-01-01 RX ADMIN — RIVAROXABAN 15 MG: 15 TABLET, FILM COATED ORAL at 20:48

## 2021-01-01 RX ADMIN — DEXTROSE MONOHYDRATE 25 G: 500 INJECTION PARENTERAL at 00:34

## 2021-01-01 RX ADMIN — SODIUM CHLORIDE 1000 ML: 9 INJECTION, SOLUTION INTRAVENOUS at 15:30

## 2021-01-01 RX ADMIN — SODIUM CHLORIDE 1 G: 9 INJECTION INTRAMUSCULAR; INTRAVENOUS; SUBCUTANEOUS at 14:43

## 2021-01-01 RX ADMIN — DEXTROSE 50 % IN WATER (D50W) INTRAVENOUS SYRINGE 25 G: at 09:30

## 2021-01-01 RX ADMIN — RIVAROXABAN 15 MG: 15 TABLET, FILM COATED ORAL at 18:49

## 2021-01-01 RX ADMIN — IOPAMIDOL 80 ML: 755 INJECTION, SOLUTION INTRAVENOUS at 09:59

## 2021-01-01 RX ADMIN — DEXTROSE MONOHYDRATE 25 G: 500 INJECTION PARENTERAL at 16:35

## 2021-01-01 RX ADMIN — SOTALOL HYDROCHLORIDE 40 MG: 80 TABLET ORAL at 09:56

## 2021-01-01 RX ADMIN — SOTALOL HYDROCHLORIDE 40 MG: 80 TABLET ORAL at 10:56

## 2021-01-01 RX ADMIN — ASPIRIN 81 MG: 81 TABLET, COATED ORAL at 09:56

## 2021-01-01 RX ADMIN — IOPAMIDOL 40 ML: 755 INJECTION, SOLUTION INTRAVENOUS at 09:59

## 2021-12-08 PROBLEM — I63.9 CVA (CEREBRAL VASCULAR ACCIDENT) (HCC): Status: ACTIVE | Noted: 2021-01-01

## 2021-12-08 NOTE — CONSULTS
[address] [facility] Cardiology Consultation    Date of Consult:  12/08/21  Date of Admission: 12/8/2021  Primary Cardiologist: Dr. Erica Villalat  Physician Requesting consult: Dr. Emery Casanova    Chief Complaint / Reason for Consult:   Weakness    History of Present Illness:  Sarah Abbott is a 80 y.o. female with the below listed medical history who was admitted with weakness. On my evaluation she was very somnolent but woke up with noxious stimuli. She denied chest pain, dyspnea or LH. She had no complaints. Oriented to self and \"hospital\" but thought she was at [facility]. Unable to tell me the year or month. Kept falling asleep. Poor historian. Per available notes: \"History could not be obtained from the patient, she appears to be confused, history was primarily obtained from the chart review I could not find the number of next of kin in the chart, apparently patient was brought from home with strokelike symptoms. Patient has been having weakness, apparently was found to be hypoglycemic when EMS arrived, was given glucagon, continues to be confused, had some left-sided neglect and some left facial droop, a code stroke was called, CT of the head did not show any acute pathology and patient was requested to be admitted to the hospitalist service, currently the patient is resting in bed, not providing much history\"    Neurology documentation:  \"80year-old female who was transported to the emergency department via EMS with altered mental status, speech difficulties, left facial droop with left-sided neglect. EMS noted patient to be hypoglycemic in the 30s with glucagon given which seems to bring blood sugar to 140's. The patient was also noted to have vomitus. According to the patient's grand-daughter, the patient is on Xarelto 15 mg daily however, unsure why patient is on Xarelto.  Per patient's granddaughter, patient uses Rollator for ambulation, has decreased cognition, and assist patient with bathing however, patient dresses herself. Patient takes her own insulin and has tendencies of overmedicating herself. Upon arrival, patient was noted to have left gaze preference with left-sided weakness, left facial droop with speech difficulties. Appears to be confused and follow commands intermittently cueing. The patient was evaluated by tele-neurologist Dr. Stephanie Palma. The patient was not a tPA candidate. CTH was negative and CTA H/N showed no LVO or any no significant stenosis. \"    PMH from office note 4/24/20:    1. Paroxysmal atrial flutter. 2. Hypertension. 3. Dyslipidemia. 4. Diabetes mellitus type 2.  5. Obesity. 6. Permanent pacemaker per Dr Joyce Ornelas    Past Medical History:   Diagnosis Date    Diabetes ([facility] [address]     High cholesterol     Hypercholesteremia     Hypertension        FINAL MEDICATION LIST - [facility] clinic visit 4/24/20 with Dr. Laura Oquendo Non-VCS   Altace 10 mg capsule take 1/2 capsule (5MG)  by oral route 2 times every day N   Aspir-81 81 mg tablet,delayed release take 1 tablet (81MG)  by oral route every day N   Betimol 0.5 % eye drops instill 1 drop by ophthalmic route 2 times every day into affected eye(s) Y   cranberry take 1 Capsule by Oral route  every day Y   Klor-Con 8 mEq tablet,extended release take 1 tablet (8MEQ)  by oral route every day with food N   Lantus Solostar U-100 Insulin 100 unit/mL (3 mL) subcutaneous pen inject by subcutaneous route as per insulin protocol N   sotalol 80 mg Tab Take 1 tablet  twice a day N   triamterene 37.5 mg-hydrochlorothiazide 25 mg capsule take 1 capsule by oral route  every day N   vitamin B12-folic acid  Y   Vitamin D3 1,000 unit capsule take 1 by Oral route once Y   Vytorin 10-20 10 mg-20 mg tablet take 1 tablet by oral route  every day N   XARELTO TABS 15MG TAKE 1 TABLET DAILY N       Prior to Admission medications    Medication Sig Start Date End Date Taking?  Authorizing Provider   cranberry fruit concentrate (Azo Cranberry) 250 mg chew Take  by mouth daily. Yes Fernando, MD Say   ramipriL (Altace) 10 mg capsule Take 10 mg by mouth daily. Yes Say King MD   potassium chloride SR (MICRO K 8) 8 mEq capsule Take 8 mEq by mouth two (2) times a day. Yes Say King MD   rivaroxaban (XARELTO) 15 mg tab tablet Take 15 mg by mouth daily. Yes Say King MD   mirabegron ER (Myrbetriq) 50 mg ER tablet Take 50 mg by mouth daily. Yes Say King MD   SITagliptin (Januvia) 50 mg tablet Take 50 mg by mouth daily. Yes Say King MD   sotaloL (BETAPACE) 80 mg tablet Take 40 mg by mouth daily. Yes Say King MD   insulin glargine (Lantus Solostar U-100 Insulin) 100 unit/mL (3 mL) inpn 30 Units by SubCUTAneous route daily. 0500 every am.   Yes Say King MD   insulin aspart U-100 (NovoLOG Flexpen U-100 Insulin) 100 unit/mL (3 mL) inpn by SubCUTAneous route. Used on an emergency base only to help lower blood glucose. Fernando, MD Say       Current Facility-Administered Medications   Medication Dose Route Frequency    acetaminophen (TYLENOL) tablet 650 mg  650 mg Oral Q4H PRN    Or    acetaminophen (TYLENOL) solution 650 mg  650 mg Per NG tube Q4H PRN    Or    acetaminophen (TYLENOL) suppository 650 mg  650 mg Rectal Q4H PRN    [START ON 12/9/2021] aspirin (ASA) suppository 150 mg  150 mg Rectal DAILY    atorvastatin (LIPITOR) tablet 80 mg  80 mg Oral QHS    famotidine (PF) (PEPCID) 20 mg in 0.9% sodium chloride 10 mL injection  20 mg IntraVENous Q24H    glucose chewable tablet 16 g  4 Tablet Oral PRN    dextrose (D50W) injection syrg 12.5-25 g  25-50 mL IntraVENous PRN    glucagon (GLUCAGEN) injection 1 mg  1 mg IntraMUSCular PRN    cefTRIAXone (ROCEPHIN) 1 g in 0.9% sodium chloride 10 mL IV syringe  1 g IntraVENous Q24H    dextrose 5% and 0.9% NaCl infusion  75 mL/hr IntraVENous CONTINUOUS     Current Outpatient Medications   Medication Sig    cranberry fruit concentrate (Azo Cranberry) 250 mg chew Take  by mouth daily.     ramipriL (Altace) 10 mg capsule Take 10 mg by mouth daily.  potassium chloride SR (MICRO K 8) 8 mEq capsule Take 8 mEq by mouth two (2) times a day.  rivaroxaban (XARELTO) 15 mg tab tablet Take 15 mg by mouth daily.  mirabegron ER (Myrbetriq) 50 mg ER tablet Take 50 mg by mouth daily.  SITagliptin (Januvia) 50 mg tablet Take 50 mg by mouth daily.  sotaloL (BETAPACE) 80 mg tablet Take 40 mg by mouth daily.  insulin glargine (Lantus Solostar U-100 Insulin) 100 unit/mL (3 mL) inpn 30 Units by SubCUTAneous route daily. 0500 every am.    insulin aspart U-100 (NovoLOG Flexpen U-100 Insulin) 100 unit/mL (3 mL) inpn by SubCUTAneous route. Used on an emergency base only to help lower blood glucose. History reviewed. No pertinent CV family history. Social History     Socioeconomic History    Marital status:      Spouse name: Not on file    Number of children: Not on file    Years of education: Not on file    Highest education level: Not on file   Occupational History    Not on file   Tobacco Use    Smoking status: Never Smoker    Smokeless tobacco: Never Used   Substance and Sexual Activity    Alcohol use: No    Drug use: Not on file    Sexual activity: Not on file   Other Topics Concern    Not on file   Social History Narrative    Not on file     Social Determinants of Health     Financial Resource Strain:     Difficulty of Paying Living Expenses: Not on file   Food Insecurity:     Worried About Running Out of Food in the Last Year: Not on file    Jareth of Food in the Last Year: Not on file   Transportation Needs:     Lack of Transportation (Medical): Not on file    Lack of Transportation (Non-Medical):  Not on file   Physical Activity:     Days of Exercise per Week: Not on file    Minutes of Exercise per Session: Not on file   Stress:     Feeling of Stress : Not on file   Social Connections:     Frequency of Communication with Friends and Family: Not on file    Frequency of Social Gatherings with Friends and Family: Not on file    Attends Mosque Services: Not on file    Active Member of Clubs or Organizations: Not on file    Attends Club or Organization Meetings: Not on file    Marital Status: Not on file   Intimate Partner Violence:     Fear of Current or Ex-Partner: Not on file    Emotionally Abused: Not on file    Physically Abused: Not on file    Sexually Abused: Not on file   Housing Stability:     Unable to Pay for Housing in the Last Year: Not on file    Number of [place] in the Last Year: Not on file    Unstable Housing in the Last Year: Not on file       Review of Systems   Unable to perform ROS: Mental acuity       Visit Vitals  BP (!) 92/58   Pulse 71   Temp 97.8 °F (36.6 °C)   Resp 18   Ht 4' 11.84\" (1.52 m) Comment: From previous encounter   Wt 99.5 kg (219 lb 5.7 oz)   SpO2 92%   BMI 43.07 kg/m²       No intake or output data in the 24 hours ending 12/08/21 1716     Physical Exam  GEN: Somnolent  HEENT: EOMI, MMM, OP clear  NECK: Normal JVP, radiation of a murmur from the precordium to b/l carotids  CV: RRR, normal S1, soft S2, harsh III/VI late-peaking systolic murmur at LUSB, no rubs or gallops  LUNGS: CTAB anterolaterally  ABD: NABS, soft, NT/ND  EXT: 1+ pitting edema in b/l ankles  PSYCH: Mood and affect normal  NEURO: Somnolent, oriented only to self and \"hospital\", speech intact    Lab Review:  BMP:   Lab Results   Component Value Date/Time     12/08/2021 09:38 AM    K 3.6 12/08/2021 09:38 AM     12/08/2021 09:38 AM    CO2 29 12/08/2021 09:38 AM    AGAP 3 (L) 12/08/2021 09:38 AM    GLU 89 12/08/2021 09:38 AM    BUN 26 (H) 12/08/2021 09:38 AM    CREA 1.20 (H) 12/08/2021 09:38 AM    GFRAA 51 (L) 12/08/2021 09:38 AM    GFRNA 42 (L) 12/08/2021 09:38 AM        CBC:  Lab Results   Component Value Date/Time    WBC 18.2 (H) 12/08/2021 09:38 AM    HGB 18.1 (H) 12/08/2021 09:38 AM    HCT 55.1 (H) 12/08/2021 09:38 AM    PLATELET 006 28/40/1757 09:38 AM MCV 96.7 12/08/2021 09:38 AM       All Cardiac Markers in the last 24 hours:  No results found for: CPK, CK, CKMMB, CKMB, RCK3, CKMBT, CKMBPOC, CKNDX, CKND1, MYRIAM, TROPT, TROIQ, VITALY, TROPT, TNIPOC, BNP, BNPP, BNPNT    No results found for: CHOL, CHOLPOCT, CHOLX, CHLST, CHOLV, HDL, HDLPOC, HDLP, LDL, LDLCPOC, LDLC, DLDLP, VLDLC, VLDL, TGLX, TRIGL, TRIGP, TGLPOCT, CHHD, CHHDX     Data Review:  ECG tracing personally reviewed:   Baseline artifact, likely sinus rhythm, inferolateral TWI, possible ischemia    Echocardiogram: N/A    Other cardiac testing: N/A    Other imaging:  CXR:  IMPRESSION  1. The lungs are clear of an acute process    Assessment:    1. Elevated troponin with ECG changes; possibly NSTEMI versus type 2 MI  2. Weakness  3. AMS  4. Hypoglycemia  5. Possible stroke  6. Paroxysmal atrial fibrillation  7. HTN  8. DM2 with other circulatory complications  9. Dyslipidemia  10. Obesity  11. S/p permanent pacemaker  12. Frailty   13. Likely aortic stenosis, at least moderate, possibly severe  14. Leukocytosis    Recommendations / Plan:  - Suspect significant aortic stenosis based on exam; TTE to evaluate  - Trend troponins  - No anginal symptoms; would trend troponins for now and consider a heparin drip if the troponin trends significantly higher  - Seems to be most consistent with type 2 MI from likely significant AS and stress of hypoglycemia, possible infection; however, NSTEMI is possible  - Telemetry  - Add on NT pro-BNP  - Given advanced age, dementia and multiple medical comorbidities would not pursue aggressive treatments or procedures; she is poor candidate for invasive procedures    Total critical care time - 30 minutes (CPT 00946)    MDM:  High  Risk of decompensation:  High    I personally spent the above critical care time.   This is time spent at this critically ill patient's bedside / unit / floor actively involved in patient care as well as the coordination of care and discussions with the patient's family. This does not include any procedural time which has been billed separately. Thank you for the opportunity to participate in the care of Jogeetha Pinky and please do not hesitate to contact us should you have any questions. Signed:  Jaqui Farr.  Juan Diego Severino, [address] [place] [address] / [address] [place] [address] [facility]  12/08/21

## 2021-12-08 NOTE — PROGRESS NOTES
Reason for Admission:  R/U CVA                     RUR Score:    11%                 Plan for utilizing home health:          PCP: First and Last name:  Will Wilkinson MD     Name of Practice:    Are you a current patient: Yes/No:    Approximate date of last visit:    Can you participate in a virtual visit with your PCP:                     Current Advanced Directive/Advance Care Plan: DNR      Healthcare Decision Maker:   Click here to complete [address] including selection of the Healthcare Decision Maker Relationship (ie \"Primary\")                             Transition of Care Plan:   Patient admitted to the hospital as OBS r/u CVA (presents w/ weakness and hypoglycemic) . Patient w/ history of DM. Patient lives home w/ granddaughter Stormy Tan [phone] and family. Noted covid r/u. Updates received from [address] [place] [address]. Patient has DME at home (chairlift/ rollator/ RW/ shower chair/ elevated toilet seats/  grab bars/ and med alert) and administers her own medications.  has 7 siblings (most still in [place]) and independently able to call by phone. Gabriel Jackie is receptive of resources provided by [facility]well ( List of Geriatricians). Patient will need assistance w/ obtaining MD at time of discharge. Call placed to granddaughter VM left w/ name/number of CM. [employer] Medicare is insurance provider. Call received from granddaughter introduced to role of CM. Informed of OBS and f/u covid. Discussed HH if order - Gabriel Mejia states her grandmother is \"stubborn\" and has refused to open the door. Granddaughter is receptive is working part time from home and her  full time at home - couple has children in the home. Noted patient is a full code. Granddaughter to provide copy of DNR and AMD.     Observation notice provided in writing to patient and/or caregiver as well as verbal explanation of the policy.   Patients who are in outpatient status also receive the Observation notice. Patient has received notice and or patient representative has received via secure email, fax, or certified mail based on patient representative's preference. Care Management Interventions  PCP Verified by CM:  Yes  Last Visit to PCP: 07/07/21  Palliative Care Criteria Met (RRAT>21 & CHF Dx)?: No  Transition of Care Consult (CM Consult): Discharge Planning  MyChart Signup: No  Discharge Durable Medical Equipment: No  Health Maintenance Reviewed: Yes  Physical Therapy Consult: Yes  Occupational Therapy Consult: Yes  Speech Therapy Consult: Yes  Support Systems: Other Family Member(s)  [place] Resource Information Provided?: No  Discharge Location  Discharge Placement:  (TBD)      Addendum - Patient is Inpatient Admission status and not OBS

## 2021-12-08 NOTE — ED NOTES
Verbal shift change report given to [address]  (oncoming nurse) by Jamal Anderson RN (offgoing nurse). Report included the following information SBAR, ED Summary, MAR and Recent Results.

## 2021-12-08 NOTE — ED TRIAGE NOTES
Pt arrived via EMS from home for hypoglycemia and stroke like symptoms. Pt's BG was 37 on arrival. Pt is diabetic and has hx of over medicating with her insulin. Pt started vomiting on arrival. Pt appears to be neglecting the left side. Code S level 2 being called.

## 2021-12-08 NOTE — ED NOTES
Pt was cleaned of an abundance of urinary incontinence. Pt had 3 briefs on prior to arrival. Bruising noted on sacrum.

## 2021-12-08 NOTE — SENIOR SERVICES NOTE
Senior Services Consult. Chart Reviewed: HX elevated cholesterol, DM, HTN, pacemaker, and questionable dementia (not diagnosed), anxiety/ panic attacks (per grand daughter at bedside). Patient here today after a Hypoglycemic event in the 30's. Patient and grand daughter, Rosa Calloway [phone] greeted in the room, I introduced myself and role as SSED NP. Patient will open eyes intermittently, leftward lean, asking to sit up. I attempted to assist, however patient remains to lean to the left. Bernadette answered all questions, however patient was able to state her name, , and in the \"hospital,\" recognizes her grand daughter and able to state that  was the last shilpi. [address] spoke on the following:  ADLs: Independent except, has been using adult briefs for years. Patient does not like to shower and will only do \"bird baths. \"  At baseline ambulates with a walker. IADLs: Assisted by family, can independently use the telephone, and has been taking her medication independently- [address] will set up the pill box and patient will do her own insulin. Believes that she doubled her dose today. Home:   -Lives in a 2 story house with her grand daughter and grand daughters family; to include spouse and great grand daughter (21years old). -No steps to get inside, patient has her won space on the second floor, family stays all upstairs. There is a chairlift to get to the 2nd floor but patient stays down stairs, has a walk in shower/ full bath, and a small kitchenette. DME:  Rollator, walker (baseline- this is what she uses daily), elevated toilet seat, grab bars, walk in shower with shower chair, Med Alert system throughout home.     -Patient is [place], has 7 siblings in which most still live in [place] and [place]; she still calls them independently via land line and able to dial independently. -Patient and siblings are all Holocaust survivors.  [address] states that her grand mother has had an increase in panic attacks recently and she has been able to talk to her and calm her down, but would like to get resources.   -Asking for a list of Geriatricians.   -Patient had a Hector sensor for BG monitoring, however over Thanksgiving patient had mistakenly read the meter and thought her BG was low and over took Glucose Tabs leading to a HYPERglycemic event. Josué Hernandez states that she only uses the Novolog to help lower BG, not used on a daily basis. -Patient has a known habit of sneaking \"full chocolate bars\" that are given to her by friends.    -Patients spouse committed suicied in 2017.   -Patient is vaccinated for COVID-19 with both Pfizer vaccines, needs to schedule her booster. ACP: Briefly discussed Code Status; Josué Hernandez is the patients POA, states that she has a DNR with a Living Will and can bring a copy next time she comes. Medication Reconciliation performed and updated via Josué Hernandez whom manages medications, allergies and pharmacy also updated. Resources provided:   -List of local Geriatricians    I strongly advised Bernadette to stop letting patient administer insulin without supervision, she was receptive. Concern for possible increased needs pending outcome of full evaluation while admitted. Josué Hernandez will need assistance, as everyone in the home works full time, her  does work from home, so someone is always in the home, maybe not in the same room or level. Recommend/ CM Needs:   -Diabetic Education while hospitalized  -Will need PT/OT once appropriate for consideration for Rehab  -Assistance with getting into a Geriatrician    Patient to be followed further by the inpatient team. I have collaborated findings with Cinthia Finn CM to assist with potential disposition needs. Thank you for the opportunity to participate in this patients care.    Elton Juan NP  SSED  2:47 PM  [phone]    Advance Care Planning performed with grand daughter, Aga Camilo Velvet [place]. Advance Care Planning (ACP) Physician/NP/PA Conversation      Conducted with: Peter daughter, Sowmya Kellogg and KOBY Hood, patient is too lethargic to participate. Healthcare Decision Maker:     Click here to complete [address] including selection of the Healthcare Decision Maker Relationship (ie \"Primary\")  Today we documented Decision Maker(s) consistent with Legal Next of Kin hierarchy. Care Preferences:    Hospitalization: \"If your health worsens and it becomes clear that your chance of recovery is unlikely, what would be your preference regarding hospitalization? \"  The patient would prefer comfort-focused treatment without hospitalization. Ventilation: \"If you were unable to breathe on your own and your chance of recovery was unlikely, what would be your preference about the use of a ventilator (breathing machine) if it was available to you? \"   The patient would NOT desire the use of a ventilator. Resuscitation: \"In the event your heart stopped as a result of an underlying serious health condition, would you want attempts to be made to restart your heart, or would you prefer a natural death? \"   No, do NOT attempt to resuscitate. Additional topics discussed: treatment goals, benefit/burden of treatment options, artificial nutrition, ventilation preferences, hospitalization preferences, resuscitation preferences, end of life care preferences (vegetative state/imminent death), hospice care and ok for medication use if possible for recovery only. Conversation Outcomes / Follow-Up Plan:   ACP in process - completing/providing documents   Reviewed DNR/DNI and patient elects DNR order - referred to ACP Clinical Specialist & placed order     Length of Voluntary ACP Conversation in minutes:  16 minutes  Rinkubruno Akbarden will e-mail a copy of patients DNR and Living Will once she gets home and I will scan it into patient chart.       Cassie Albert, NP   SSED  3:08 PM

## 2021-12-08 NOTE — ED PROVIDER NOTES
42-year-old female with a history of diabetes, high cholesterol, and hypertension was brought in by EMS after they were called for strokelike symptoms this morning. Last known well was yesterday sometime. EMS noted the patient to be hypoglycemic, but they could not get IV access. They gave some glucagon. Patient was still confused when I saw her, even after we gave her dextrose. A recheck of her blood sugar was 140 mg/dL and patient was still confused and neglecting the left side with some left facial droop, so a code stroke was called. Patient was also vomiting. Past Medical History:   Diagnosis Date    Diabetes ([facility] [address]     High cholesterol     Hypertension        Past Surgical History:   Procedure Laterality Date    HX HYSTERECTOMY      HX ORTHOPAEDIC      bilateral rods in femurs    HX PACEMAKER           History reviewed. No pertinent family history. Social History     Socioeconomic History    Marital status:      Spouse name: Not on file    Number of children: Not on file    Years of education: Not on file    Highest education level: Not on file   Occupational History    Not on file   Tobacco Use    Smoking status: Never Smoker    Smokeless tobacco: Never Used   Substance and Sexual Activity    Alcohol use: No    Drug use: Not on file    Sexual activity: Not on file   Other Topics Concern    Not on file   Social History Narrative    Not on file     Social Determinants of Health     Financial Resource Strain:     Difficulty of Paying Living Expenses: Not on file   Food Insecurity:     Worried About Running Out of Food in the Last Year: Not on file    Jareth of Food in the Last Year: Not on file   Transportation Needs:     Lack of Transportation (Medical): Not on file    Lack of Transportation (Non-Medical):  Not on file   Physical Activity:     Days of Exercise per Week: Not on file    Minutes of Exercise per Session: Not on file   Stress:     Feeling of Stress : Not on file   Social Connections:     Frequency of Communication with Friends and Family: Not on file    Frequency of Social Gatherings with Friends and Family: Not on file    Attends [employer] Services: Not on file    Active Member of Clubs or Organizations: Not on file    Attends Club or Organization Meetings: Not on file    Marital Status: Not on file   Intimate Partner Violence:     Fear of Current or Ex-Partner: Not on file    Emotionally Abused: Not on file    Physically Abused: Not on file    Sexually Abused: Not on file   Housing Stability:     Unable to Pay for Housing in the Last Year: Not on file    Number of [place] in the Last Year: Not on file    Unstable Housing in the Last Year: Not on file         ALLERGIES: Hydrocodone    Review of Systems   Constitutional: Negative for fever. HENT: Negative for trouble swallowing. Eyes: Negative for visual disturbance. Respiratory: Negative for cough. Cardiovascular: Negative for chest pain. Gastrointestinal: Positive for nausea and vomiting. Negative for abdominal pain. Genitourinary: Negative for difficulty urinating. Skin: Negative for rash. Neurological: Negative for headaches. Hematological: Does not bruise/bleed easily. Psychiatric/Behavioral: Negative for sleep disturbance. Vitals:    12/08/21 0950   BP: (!) 147/125   Pulse: 89   Resp: 18   SpO2: 95%   Weight: 99.5 kg (219 lb 5.7 oz)            Physical Exam  Constitutional:       Appearance: She is ill-appearing. Comments: Wrapped up in quite a few blankets, vomiting occasionally  Smells of urine   HENT:      Head: Normocephalic. Nose: Nose normal.      Mouth/Throat:      Mouth: Mucous membranes are moist.   Eyes:      General: Visual field deficit present. Extraocular Movements: Extraocular movements intact. Conjunctiva/sclera: Conjunctivae normal.   Cardiovascular:      Rate and Rhythm: Normal rate.    Pulmonary:      Effort: Pulmonary effort is normal. No respiratory distress. Abdominal:      Palpations: Abdomen is soft. Tenderness: There is abdominal tenderness. Skin:     Findings: No rash. Neurological:      Mental Status: She is alert. She is confused. GCS: GCS eye subscore is 4. GCS verbal subscore is 5. GCS motor subscore is 6. Cranial Nerves: Facial asymmetry present. Motor: Weakness present. Psychiatric:         Behavior: Behavior normal.          MDM  Number of Diagnoses or Management Options  Cerebrovascular accident (CVA), unspecified mechanism ([address]  Facial asymmetry  Hypoglycemia  Left-sided neglect  Leukocytosis, unspecified type  Vomiting, intractability of vomiting not specified, presence of nausea not specified, unspecified vomiting type  Diagnosis management comments: Perfect Serve Consult for Admission  10:53 AM    ED Room Number: ER26/26  Patient Name and age:  Duey Headings 80 y.o.  female  Working Diagnosis: Cerebrovascular accident (CVA), unspecified mechanism ([address]  (primary encounter diagnosis)  Facial asymmetry  Left-sided neglect  Hypoglycemia  Vomiting, intractability of vomiting not specified, presence of nausea not specified, unspecified vomiting type  Leukocytosis, unspecified type    COVID-19 Suspicion:  no  Sepsis present:  no  Reassessment needed: no  Code Status:  Full Code  Readmission: no  Isolation Requirements:  no  Recommended Level of Care:  telemetry  Department:[facility] Adult ED - 21   Other: Brought in by EMS for hypoglycemia after they were called for strokelike symptoms. After the hypoglycemia was addressed, the patient still had confusion and was found to have had a stroke. She already takes Xarelto and the teleneurologist recommended having her continue that, unless she cannot swallow. In that case we would recommend aspirin.            Procedures

## 2021-12-08 NOTE — PROGRESS NOTES
NIHSS Code Stroke Documentation      Person Administering Scale: Olivia Santizo NP    TIME: 09:50 AM    LKW: Last night at 21:30 PM    PMH: Diabetes, hypretension, pacemaker placement, hypercholesterolemia, and probable new onset dementia    SUBJECTIVE: 80-year-old female who was transported to the emergency department via EMS with altered mental status, speech difficulties, left facial droop with left-sided neglect. EMS noted patient to be hypoglycemic in the 30s with glucagon given which seems to bring blood sugar to 140's. The patient was also noted to have vomitus. According to the patient's grand-daughter, the patient is on Xarelto 15 mg daily however, unsure why patient is on Xarelto. Per patient's granddaughter, patient uses Rollator for ambulation, has decreased cognition, and assist patient with bathing however, patient dresses herself. Patient takes her own insulin and has tendencies of overmedicating herself. Upon arrival, patient was noted to have left gaze preference with left-sided weakness, left facial droop with speech difficulties. Appears to be confused and follow commands intermittently cueing. The patient was evaluated by tele-neurologist Dr. Oneal Jones. The patient was not a tPA candidate. CTH was negative and CTA H/N showed no LVO or any no significant stenosis. 1a  Level of consciousness: 1=not alert but arousable by minor stimulation to obey, answer or respond   1b. LOC questions:  2=Answers neither question correctly   1c. LOC commands: 1=Performs one task correctly   2. Best Gaze: 1=partial gaze palsy; gaze is abnormal in one or both eyes, but forced deviation or total gaze paresis is not present. 3. Visual: 0=No visual loss   4. Facial Palsy: 1=Minor paralysis (flattened nasolabial fold, asymmetric on smiling)   5a. Motor left arm: 2=Some effort against gravity, arm cannot get to or maintain (if cured) 90 (or 45) degrees, drifts down to bed, but has some effort against gravity.    5b. Motor right arm: 1=Drift, arm holds 90 (or 45) degrees but drifts down before full 10 seconds: does not hit bed. 6a. Motor left leg: 3=No effort against gravity; leg falls to bed immediately   6b  Motor right le=Some effort against gravity; leg falls to bed by 5 seconds, but has some effort against gravity. 7. Limb Ataxia: 0=Absent   8. Sensory: 1=Mild to moderate sensory loss; patient feels pinprick is less sharp or is dull on the affected side; or there is a loss of superficial pain with pinprick but patient is aware of being touched. 9. Best Language:  1=Mild to moderate aphasia; some obvious loss of fluency or facility of comprehension without significant limitation on ideas expressed or form of expression. Reduction of speech and/or comprehension, however, makes conversation about provided materials difficult or impossible. For example, in conversation about provided materials, examiner can identify picture or naming card content from patients response. 10. Dysarthria: 1=Mild to moderate, patient slurs at least some words and at worst, can be understood with some difficulty   11. Extinction and Inattention: 1=Visual, tactile, auditory, spatial or personal inattention or extinction to bilateral simultaneous stimulation in one of the sensory modalities    Total:    19     VAN: POSITIVE    tPA Candidate: NO    Mechanical Thrombectomy Candidate: NO    ANTIPLT/AC/ANTITHROMB: YES (Xarelto)    CT head without contrast    FINDINGS:  There is moderate atrophy with compensatory dilatation of ventricles. . There is  no significant white matter disease. There is no intracranial hemorrhage,  extra-axial collection, or mass effect. The basilar cisterns are open. No CT  evidence of acute infarct.     The bone windows demonstrate no abnormalities. The visualized portions of the  paranasal sinuses and mastoid air cells are clear.  The patient is status post  bilateral lens surgery.     IMPRESSION  No evidence of acute process. EXAM:  CTA CODE NEURO HEAD AND NECK W CONT, CT CODE NEURO PERF W CBF     INDICATION:   Code Stroke     COMPARISON:  CT head 12/8/2021.     CONTRAST:  120 mL of Isovue-370.     TECHNIQUE:  Unenhanced  images were obtained to localize the volume for  acquisition. Multislice helical axial CT angiography was performed from the  aortic arch to the top of the head during uneventful rapid bolus intravenous  contrast administration. Coronal and sagittal reformations and 3D post  processing was performed. CT dose reduction was achieved through use of a  standardized protocol tailored for this examination and automatic exposure  control for dose modulation.     CT brain perfusion was performed with generation of hemodynamic maps of multiple  parameters, including cerebral blood flow, cerebral blood volume, and MTT (mean  transit time). CT dose reduction was achieved through use of a standardized  protocol tailored for this examination and automatic exposure control for dose  modulation.     This study was analyzed by the Viz.  algorithm.     FINDINGS:     CTA Head:  There is no evidence of large vessel occlusion or flow-limiting stenosis of the  intracranial internal carotid, anterior cerebral, and middle cerebral arteries. Calcification of the bilateral carotid siphons without significant stenosis. The  anterior communicating artery is patent.      There is no evidence of large vessel occlusion or flow-limiting stenosis of the  intracranial vertebral arteries, basilar artery, or posterior cerebral arteries. The posterior communicating arteries are large and patent.      There is no evidence of aneurysm or vascular malformation. The dural venous  sinuses and deep cerebral venous system are patent. No evidence of abnormal  enhancement on delayed phase images.     CTA NECK:  NASCET method was utilized for calculating stenosis.     Mild calcific atherosclerosis of the aortic arch.  The common carotid arteries  demonstrate no significant stenosis. Mild calcific atherosclerosis of the right  carotid bifurcation without significant stenosis. Mild calcific atherosclerosis  of the left carotid bifurcation without significant stenosis.     There is a left dominant vertebrobasilar arterial system. The cervical vertebral  arteries are normal in course, size and contour without significant stenosis.      Visualized soft tissues of the neck are unremarkable. Visualized lung apices are  clear. Left chest cardiac device noted. No acute fracture or aggressive osseous  lesion. Multilevel degenerative disc disease in the cervical spine, most  advanced at C5-C6.     CT Brain Perfusion:  There are no regional areas of elevated Tmax, decreased cerebral blood flow or  blood volume. rCBF < 30% = 0 cc. Tmax > 6 seconds = 0 cc.     IMPRESSION  CTA Head:  1. No evidence of significant stenosis or aneurysm.     CTA Neck:  1. No evidence of significant stenosis.     CT Brain Perfusion:  1. No acute abnormality.       CT Results (most recent):  Results from Hospital Encounter encounter on 12/08/21    CT ABD PELV WO CONT    Narrative  EXAM: CT ABD PELV WO CONT    INDICATION: confused, hypoglycemic, vomiting, abd pain. COMPARISON: None    CONTRAST:  None. TECHNIQUE:  Thin axial images were obtained through the abdomen and pelvis. Coronal and  sagittal reformats were generated. Oral contrast was not administered. CT dose  reduction was achieved through use of a standardized protocol tailored for this  examination and automatic exposure control for dose modulation. The absence of intravenous contrast material reduces the sensitivity for  evaluation of the vasculature and solid organs. FINDINGS:  LOWER THORAX: A pacemaker is in place. LIVER: No mass. BILIARY TREE: Status post cholecystectomy. CBD is not dilated. SPLEEN: within normal limits. PANCREAS: Several calcifications likely related to prior pancreatitis. No  evidence of acute pancreatitis. No ductal dilatation. ADRENALS: Unremarkable. KIDNEYS/URETERS: No calculus or hydronephrosis. STOMACH: Mild hiatal hernia. SMALL BOWEL: No dilatation or wall thickening. COLON: No dilatation or wall thickening. Numerous colonic diverticula. No  evidence of diverticulitis. APPENDIX: Unremarkable. PERITONEUM: No ascites or pneumoperitoneum. RETROPERITONEUM: No lymphadenopathy or aortic aneurysm. REPRODUCTIVE ORGANS: Status post hysterectomy. URINARY BLADDER: No mass or calculus. BONES: Status post ORIF of the bilateral proximal femurs. ABDOMINAL WALL: No mass or hernia. ADDITIONAL COMMENTS: N/A    Impression  No evidence of acute process. Discussed with: tele-neurologist Dr. Allison Juares, ED physician, patient's grand-daughter, and ED nurse    Time spent: 35 minutes. Austin Ellis NP  Neurocritical Care Nurse Practitioner  [phone]    Please note that this dictation was completed with [employer], the [employer] voice recognition software. Quite often unanticipated grammatical, syntax, homophones, and other interpretive errors are inadvertently transcribed by the computer software. Please excuse any errors that have escaped final proofreading.

## 2021-12-08 NOTE — H&P
History & Physical    Primary Care Provider: Stas Dillon MD  Source of Information: Patient'S CHART    History of Presenting Illness:   Nieves David is a 80 y.o. female who presents with weakness    History could not be obtained from the patient, she appears to be confused, history was primarily obtained from the chart review I could not find the number of next of kin in the chart, apparently patient was brought from home with strokelike symptoms. Patient has been having weakness, apparently was found to be hypoglycemic when EMS arrived, was given glucagon, continues to be confused, had some left-sided neglect and some left facial droop, a code stroke was called, CT of the head did not show any acute pathology and patient was requested to be admitted to the hospitalist service, currently the patient is resting in bed, not providing much history  . Review of Systems:  Review of systems not obtained due to patient factors. CONFUSION    All other systems reviewed, pertinent positives and negatives noted in HPI    Past Medical History:   Diagnosis Date    Diabetes ([facility] [address]     High cholesterol     Hypercholesteremia     Hypertension       Past Surgical History:   Procedure Laterality Date    HX HYSTERECTOMY      HX ORTHOPAEDIC      bilateral rods in femurs    HX PACEMAKER       Prior to Admission medications    Medication Sig Start Date End Date Taking? Authorizing Provider   cranberry fruit concentrate (Azo Cranberry) 250 mg chew Take  by mouth daily. Yes Other, MD Say   ramipriL (Altace) 10 mg capsule Take 10 mg by mouth daily. Yes Other, MD Say   potassium chloride SR (MICRO K 8) 8 mEq capsule Take 8 mEq by mouth two (2) times a day. Yes Other, MD Say   rivaroxaban (XARELTO) 15 mg tab tablet Take 15 mg by mouth daily. Yes Other, MD Say   mirabegron ER (Myrbetriq) 50 mg ER tablet Take 50 mg by mouth daily.    Yes Fernando, MD Say   SITagliptin (Januvia) 50 mg tablet Take 50 mg by mouth daily. Yes Fernando, MD Say   sotaloL (BETAPACE) 80 mg tablet Take 40 mg by mouth daily. Yes Say King MD   insulin glargine (Lantus Solostar U-100 Insulin) 100 unit/mL (3 mL) inpn 30 Units by SubCUTAneous route daily. 0500 every am.   Yes Say King MD   insulin aspart U-100 (NovoLOG Flexpen U-100 Insulin) 100 unit/mL (3 mL) inpn by SubCUTAneous route. Used on an emergency base only to help lower blood glucose. Fernando, MD Say     Allergies   Allergen Reactions    Hydrocodone Unknown (comments)      History reviewed. No pertinent family history. Family history was discussed with the patient, all pertinent and relevant details are mentioned as above, no other pertinent and relevant family history was noted on my discussion with the patient.   Patient specifically denies any history of Gaucher disease in the family  SOCIAL HISTORY:  Patient resides:  Independently x   Assisted Living    SNF    With family care       Smoking history:   None    Former    Chronic    None known  Alcohol history:   None    Social    Chronic    On known  Ambulates:   Independently    w/cane    w/walker    w/wc      Unknown  CODE STATUS:  DNR    Full x   Other      Objective:     Physical Exam:     Visit Vitals  BP (!) 162/101 (BP 1 Location: Right upper arm, BP Patient Position: At rest)   Pulse 85   Temp 97.8 °F (36.6 °C)   Resp 16   Wt 99.5 kg (219 lb 5.7 oz)   SpO2 98%   BMI 42.84 kg/m²      O2 Device: None (Room air)    General : alert x 1, confused  HEENT: PEERL, left-sided facial droop  Neck: supple,   Chest: Decreased basal breath sounds  CVS: S1 S2 heard,   Abd: soft/ Non tender, non distended, BS physiological,   Ext: no clubbing, no cyanosis, no edema, brisk 2+ DP pulses  Neuro/Psych: Very limited exam, patient appears to move all 4 but strength could not be tested, sensory appears to be grossly within normal limits, DTR 1+x4, cranial nerves could not be tested   Skin: warm     EKG: Pending      Data Review:     Recent Days:  Recent Labs     12/08/21  0938   WBC 18.2*   HGB 18.1*   HCT 55.1*        Recent Labs     12/08/21  0938      K 3.6      CO2 29   GLU 89   BUN 26*   CREA 1.20*   CA 10.0   ALB 3.6   ALT 48   INR 1.0     No results for input(s): PH, PCO2, PO2, HCO3, FIO2 in the last 72 hours. 24 Hour Results:  Recent Results (from the past 24 hour(s))   CBC WITH AUTOMATED DIFF    Collection Time: 12/08/21  9:38 AM   Result Value Ref Range    WBC 18.2 (H) 3.6 - 11.0 K/uL    RBC 5.70 (H) 3.80 - 5.20 M/uL    HGB 18.1 (H) 11.5 - 16.0 g/dL    HCT 55.1 (H) 35.0 - 47.0 %    MCV 96.7 80.0 - 99.0 FL    MCH 31.8 26.0 - 34.0 PG    MCHC 32.8 30.0 - 36.5 g/dL    RDW 12.6 11.5 - 14.5 %    PLATELET 828 971 - 321 K/uL    MPV 10.5 8.9 - 12.9 FL    NRBC 0.0 0  WBC    ABSOLUTE NRBC 0.00 0.00 - 0.01 K/uL    NEUTROPHILS 88 (H) 32 - 75 %    LYMPHOCYTES 6 (L) 12 - 49 %    MONOCYTES 5 5 - 13 %    EOSINOPHILS 0 0 - 7 %    BASOPHILS 0 0 - 1 %    IMMATURE GRANULOCYTES 1 (H) 0.0 - 0.5 %    ABS. NEUTROPHILS 16.0 (H) 1.8 - 8.0 K/UL    ABS. LYMPHOCYTES 1.1 0.8 - 3.5 K/UL    ABS. MONOCYTES 0.9 0.0 - 1.0 K/UL    ABS. EOSINOPHILS 0.0 0.0 - 0.4 K/UL    ABS. BASOPHILS 0.0 0.0 - 0.1 K/UL    ABS. IMM. GRANS. 0.1 (H) 0.00 - 0.04 K/UL    DF AUTOMATED     METABOLIC PANEL, COMPREHENSIVE    Collection Time: 12/08/21  9:38 AM   Result Value Ref Range    Sodium 137 136 - 145 mmol/L    Potassium 3.6 3.5 - 5.1 mmol/L    Chloride 105 97 - 108 mmol/L    CO2 29 21 - 32 mmol/L    Anion gap 3 (L) 5 - 15 mmol/L    Glucose 89 65 - 100 mg/dL    BUN 26 (H) 6 - 20 MG/DL    Creatinine 1.20 (H) 0.55 - 1.02 MG/DL    BUN/Creatinine ratio 22 (H) 12 - 20      GFR est AA 51 (L) >60 ml/min/1.73m2    GFR est non-AA 42 (L) >60 ml/min/1.73m2    Calcium 10.0 8.5 - 10.1 MG/DL    Bilirubin, total 0.3 0.2 - 1.0 MG/DL    ALT (SGPT) 48 12 - 78 U/L    AST (SGOT) 51 (H) 15 - 37 U/L    Alk.  phosphatase 91 45 - 117 U/L    Protein, total 8.5 (H) 6.4 - 8.2 g/dL    Albumin 3.6 3.5 - 5.0 g/dL    Globulin 4.9 (H) 2.0 - 4.0 g/dL    A-G Ratio 0.7 (L) 1.1 - 2.2     SAMPLES BEING HELD    Collection Time: 12/08/21  9:38 AM   Result Value Ref Range    SAMPLES BEING HELD 1BLU,1RED     COMMENT        Add-on orders for these samples will be processed based on acceptable specimen integrity and analyte stability, which may vary by analyte. PROTHROMBIN TIME + INR    Collection Time: 12/08/21  9:38 AM   Result Value Ref Range    INR 1.0 0.9 - 1.1      Prothrombin time 10.9 9.0 - 11.1 sec   GLUCOSE, POC    Collection Time: 12/08/21  9:42 AM   Result Value Ref Range    Glucose (POC) 143 (H) 65 - 117 mg/dL    Performed by [address] [place] [address], POC    Collection Time: 12/08/21 10:29 AM   Result Value Ref Range    Glucose (POC) 116 65 - 117 mg/dL    Performed by Estrella Mejia    URINALYSIS W/MICROSCOPIC    Collection Time: 12/08/21 11:23 AM   Result Value Ref Range    Color YELLOW/STRAW      Appearance CLOUDY (A) CLEAR      Specific gravity 1.015      pH (UA) 7.0 5.0 - 8.0      Protein >300 (A) NEG mg/dL    Glucose 250 (A) NEG mg/dL    Ketone Negative NEG mg/dL    Bilirubin Negative NEG      Blood MODERATE (A) NEG      Urobilinogen 0.2 0.2 - 1.0 EU/dL    Nitrites Negative NEG      Leukocyte Esterase Negative NEG      WBC 20-50 0 - 4 /hpf    RBC 0-5 0 - 5 /hpf    Epithelial cells FEW FEW /lpf    Bacteria 4+ (A) NEG /hpf    Hyaline cast 2-5 0 - 5 /lpf   URINE CULTURE HOLD SAMPLE    Collection Time: 12/08/21 11:23 AM    Specimen: Serum   Result Value Ref Range    Urine culture hold        Urine on hold in Microbiology dept for 2 days. If unpreserved urine is submitted, it cannot be used for addtional testing after 24 hours, recollection will be required. Imaging:     CT ABD PELV WO CONT    Result Date: 12/8/2021  No evidence of acute process. XR CHEST PORT    Result Date: 12/8/2021  1.  The lungs are clear of an acute process    CTA CODE NEURO HEAD AND NECK W CONT    Result Date: 12/8/2021  CTA Head: 1. No evidence of significant stenosis or aneurysm. CTA Neck: 1. No evidence of significant stenosis. CT Brain Perfusion: 1. No acute abnormality. CT CODE NEURO HEAD WO CONTRAST    Result Date: 12/8/2021  No evidence of acute process. CT CODE NEURO PERF W CBF    Result Date: 12/8/2021  CTA Head: 1. No evidence of significant stenosis or aneurysm. CTA Neck: 1. No evidence of significant stenosis. CT Brain Perfusion: 1. No acute abnormality. Assessment/Plan     Left facial droop  UTI  Metabolic encephalopathy  Diabetes mellitus type 2    Patient was admitted on a telemetry bed  Rule out CVA, MRI of the brain, neurovascular checks, aspirin, statin, IV hydration, permissive hypertension, PT OT speech consult, monitor  IV antibiotics, urine culture, monitor  Unclear etiology, could be secondary to CVA versus seizure versus underlying infection, continue antibiotics, MRI of the brain, IV hydration, EEG, neurology consult, monitor  Sliding-scale insulin, Accu-Cheks, diet control, close monitoring    GI DVT prophylaxis: Patient will be on SCD             Please note that this dictation was completed with [employer], the [employer] voice recognition software. Quite often unanticipated grammatical, syntax, homophones, and other interpretive errors are inadvertently transcribed by the computer software. Please disregard these errors. Please excuse any errors that have escaped final proofreading.          Signed By: Aditi Julien MD     December 8, 2021

## 2021-12-09 NOTE — PROGRESS NOTES
Physical Therapy:     Orders received, chart reviewed and patient evaluated by physical therapy. Pending progression with skilled acute physical therapy, recommend:  Physical therapy at least 2 days/week in the home AND ensure assist and/or supervision for safety with mobility and ADLs    Full evaluation to follow.      David Helton, PT, DPT

## 2021-12-09 NOTE — PROGRESS NOTES
[facility] Adult  Hospitalist Group                                                                                          Hospitalist Progress Note  Jesus Stearns MD  Answering service: [phone] OR [phone] from in house phone        Date of Service:  2021  NAME:  Darlyn Ramon  :  3/30/1926  MRN:  [id number]      Admission Summary:   79 yo female with PMHx of Paroxysmal Atrial flutter, DM, HTN, HLD, s/p PPM admitted for confusion, slurred speech and questionable left facial droop. Interval history / Subjective:     Pt seen and examined  Awake and alert this AM   Aware that she is in hospital but unable to tell me moth and year       Assessment & Plan:     Left sided facial droop  - possible TIA  - unable to get MRI due to PPM   - added ASA to Xarelto  - c/w PT/OT/Speech  - c/w statin     ACute Metabolic Encephalopathy   - due to Hypoglycemia and UTI  - on IV Abx  - BG better  - follow cx    Paroxysmal Atrial flutter  - c/w Sotalol, Xarelto    HTN  - c/w Lisinopril    DM type 2  - not on meds  - A1c 6.2  - watch off meds    Possible NSTEMI with Possible AS  - ECHO pending  - not a good candidate for aggressive work up   - Pro-BNP high   - c/w ASA/statin  - no symptoms , trop trending down     Code status: DNR  - documented on 21 by Paul Crisostomo NP, awaiting documents to be scanned into chart. DVT prophylaxis: Xarelto     Care Plan discussed with: Patient/Family, Nurse and   Anticipated Disposition:  PT, OT, RN  Anticipated Discharge: 24 hours to 48 hours     Hospital Problems  Never Reviewed          Codes Class Noted POA    CVA (cerebral vascular accident) [facility]) ICD-10-CM: I63.9  ICD-9-CM: 434.91  2021 Unknown                Review of Systems:   Review of systems not obtained due to patient factors. Vital Signs:    Last 24hrs VS reviewed since prior progress note.  Most recent are:  Visit Vitals  BP (!) 111/55 (BP 1 Location: Right upper arm, BP Patient Position: At rest;Sitting)   Pulse 76   Temp 97.7 °F (36.5 °C)   Resp 20   Ht 4' 11\" (1.499 m)   Wt 99.5 kg (219 lb 5.7 oz)   SpO2 96%   BMI 44.30 kg/m²       No intake or output data in the 24 hours ending 12/09/21 1515     Physical Examination:     I had a face to face encounter with this patient and independently examined them on 12/9/2021 as outlined below:          Constitutional:  No acute distress, cooperative, pleasant    ENT:  Oral mucosa moist, oropharynx benign. Resp:  CTA bilaterally. CV:  Regular rhythm, normal rate,    GI:  Soft, non distended, non tender. normoactive bowel sounds,     Musculoskeletal:  No edema, warm, 2+ pulses throughout    Neurologic:  awake, oriented to 1, subtle left facial asymmetry, motor 5/5             Data Review:    Review and/or order of clinical lab test  Review and/or order of tests in the radiology section of CPT  Review and/or order of tests in the medicine section of CPT      Labs:     Recent Labs     12/09/21  0506 12/08/21  0938   WBC 17.6* 18.2*   HGB 14.5 18.1*   HCT 44.4 55.1*    225     Recent Labs     12/09/21  0506 12/08/21  0938   * 137   K HEMOLYZED,RECOLLECT REQUESTED 3.6    105   CO2 25 29   BUN 26* 26*   CREA 1.16* 1.20*   GLU 72 89   CA 9.4 10.0     Recent Labs     12/08/21  0938   ALT 48   AP 91   TBILI 0.3   TP 8.5*   ALB 3.6   GLOB 4.9*     Recent Labs     12/08/21  0938   INR 1.0   PTP 10.9      No results for input(s): FE, TIBC, PSAT, FERR in the last 72 hours. No results found for: FOL, RBCF   No results for input(s): PH, PCO2, PO2 in the last 72 hours. No results for input(s): CPK, CKNDX, TROIQ in the last 72 hours.     No lab exists for component: CPKMB  Lab Results   Component Value Date/Time    Cholesterol, total 166 12/09/2021 05:06 AM    HDL Cholesterol 77 12/09/2021 05:06 AM    LDL, calculated 67.2 12/09/2021 05:06 AM    Triglyceride 109 12/09/2021 05:06 AM    CHOL/HDL Ratio 2.2 12/09/2021 05:06 AM     Lab Results Component Value Date/Time    Glucose (POC) 85 12/09/2021 11:22 AM    Glucose (POC) 87 12/09/2021 07:16 AM    Glucose (POC) 75 12/09/2021 05:29 AM    Glucose (POC) 80 12/09/2021 01:04 AM    Glucose (POC) 64 (L) 12/09/2021 12:28 AM     Lab Results   Component Value Date/Time    Color YELLOW/STRAW 12/08/2021 11:23 AM    Appearance CLOUDY (A) 12/08/2021 11:23 AM    Specific gravity 1.015 12/08/2021 11:23 AM    Specific gravity 1.013 10/27/2017 11:35 AM    pH (UA) 7.0 12/08/2021 11:23 AM    Protein >300 (A) 12/08/2021 11:23 AM    Glucose 250 (A) 12/08/2021 11:23 AM    Ketone Negative 12/08/2021 11:23 AM    Bilirubin Negative 12/08/2021 11:23 AM    Urobilinogen 0.2 12/08/2021 11:23 AM    Nitrites Negative 12/08/2021 11:23 AM    Leukocyte Esterase Negative 12/08/2021 11:23 AM    Epithelial cells FEW 12/08/2021 11:23 AM    Bacteria 4+ (A) 12/08/2021 11:23 AM    WBC 20-50 12/08/2021 11:23 AM    RBC 0-5 12/08/2021 11:23 AM         Medications Reviewed:     Current Facility-Administered Medications   Medication Dose Route Frequency    sotaloL (BETAPACE) tablet 40 mg  40 mg Oral DAILY    rivaroxaban (XARELTO) tablet 15 mg  15 mg Oral QPM    lisinopriL (PRINIVIL, ZESTRIL) tablet 40 mg  40 mg Oral DAILY    mirabegron ER (MYRBETRIQ) tablet 50 mg (Patient Supplied)  50 mg Oral DAILY    acetaminophen (TYLENOL) tablet 650 mg  650 mg Oral Q4H PRN    Or    acetaminophen (TYLENOL) solution 650 mg  650 mg Per NG tube Q4H PRN    Or    acetaminophen (TYLENOL) suppository 650 mg  650 mg Rectal Q4H PRN    aspirin (ASA) suppository 150 mg  150 mg Rectal DAILY    atorvastatin (LIPITOR) tablet 80 mg  80 mg Oral QHS    famotidine (PF) (PEPCID) 20 mg in 0.9% sodium chloride 10 mL injection  20 mg IntraVENous Q24H    glucose chewable tablet 16 g  4 Tablet Oral PRN    dextrose (D50W) injection syrg 12.5-25 g  25-50 mL IntraVENous PRN    glucagon (GLUCAGEN) injection 1 mg  1 mg IntraMUSCular PRN    cefTRIAXone (ROCEPHIN) 1 g in 0.9% sodium chloride 10 mL IV syringe  1 g IntraVENous Q24H    dextrose 5% and 0.9% NaCl infusion  75 mL/hr IntraVENous CONTINUOUS     ______________________________________________________________________  EXPECTED LENGTH OF STAY: - - -  ACTUAL LENGTH OF STAY:          1                 Wanda Valle MD

## 2021-12-09 NOTE — PROGRESS NOTES
Problem: Mobility Impaired (Adult and Pediatric)  Goal: *Acute Goals and Plan of Care (Insert Text)  Description: FUNCTIONAL STATUS PRIOR TO ADMISSION: Pt was mod I using rollator for ambulation and was independent with ADLs per pt report. HOME SUPPORT PRIOR TO ADMISSION: Pt lives with granddaughter and granddaughter's family. Physical Therapy Goals  Initiated 12/9/2021  1. Patient will move from supine to sit and sit to supine , scoot up and down, and roll side to side in bed with modified independence within 7 day(s). 2.  Patient will transfer from bed to chair and chair to bed with modified independence using the least restrictive device within 7 day(s). 3.  Patient will perform sit to stand with modified independence within 7 day(s). 4.  Patient will ambulate with modified independence for 50 feet with the least restrictive device within 7 day(s). Outcome: Not Met  PHYSICAL THERAPY EVALUATION  Patient: Alfredo Ge (75 y.o. female)  Date: 12/9/2021  Primary Diagnosis: CVA (cerebral vascular accident) [facility]) [I63.9]        Precautions: Fall      ASSESSMENT  Based on the objective data described below, the patient presents with generalized weakness (formal MMT unsuccessful due to impaired cognition), impaired balance, decreased activity tolerance, L trunk lean, confusion, and overall decline in functional mobility s/p admission for stroke workup - CT negative, pt unable to get an MRI. At baseline, she lives with family and was mod I using rollator for mobility. This date, she transferred supine>sit with CGA and additional time, sit<>stand with Disha, and took a few steps bed>chair with Disha and use of RW. Recommending  PT and increased assistance from family upon discharge. If family is unable to provide assistance x1 for all out of bed mobility, then recommending SNF.      Current Level of Function Impacting Discharge (mobility/balance): Disha for transfers and taking steps to chair    Functional Outcome Measure: The patient scored 45/100 on the Barthel outcome measure which is indicative of 55% impairment and dependence on others for basic mobility and self care tasks. Other factors to consider for discharge: Fall risk, confusion, lives with family      Patient will benefit from skilled therapy intervention to address the above noted impairments. PLAN :  Recommendations and Planned Interventions: bed mobility training, transfer training, gait training, therapeutic exercises, neuromuscular re-education, patient and family training/education, and therapeutic activities      Frequency/Duration: Patient will be followed by physical therapy:  3 times a week to address goals. Recommendation for discharge: (in order for the patient to meet his/her long term goals)  Physical therapy at least 2 days/week in the home AND ensure assist and/or supervision for safety with mobility and ADLs  If family is unable to provide increased assistance, then recommending SNF     This discharge recommendation:  Has been made in collaboration with the attending provider and/or case management    IF patient discharges home will need the following DME: patient owns DME required for discharge         SUBJECTIVE:   Patient stated I was way off.  when reoriented to the year after she stated it was 1989    OBJECTIVE DATA SUMMARY:   HISTORY:    Past Medical History:   Diagnosis Date    Diabetes ([facility] [address]     High cholesterol     Hypercholesteremia     Hypertension      Past Surgical History:   Procedure Laterality Date    HX HYSTERECTOMY      HX ORTHOPAEDIC      bilateral rods in femurs    HX PACEMAKER       Home Situation  Home Environment: Private residence  One/Two Story Residence: Two story, live on 1st floor  Lift Chair Available: Yes  Living Alone: No (with granddaughter + her family)  Support Systems: Other Family Member(s)  Current DME Used/Available at Home: Marissa Crowley, rollator, [address] [facility] chairJhonny bars  Tub or Shower Type: Shower    EXAMINATION/PRESENTATION/DECISION MAKING:   Critical Behavior:  Neurologic State: Alert  Orientation Level: Oriented to person, Oriented to situation, Disoriented to place, Disoriented to time  Cognition: Follows commands  Safety/Judgement: Awareness of environment    Range Of Motion:  AROM: Generally decreased, functional                       Strength:    Strength: Generally decreased, functional                    Tone & Sensation:   Tone: Normal              Sensation:  (reports intact, assessment inconsistent)               Coordination:  Coordination: Generally decreased, functional  Vision:   Tracking: Able to track left of midline; Able to track right of midline; Requires cues, head turns, or add eye shifts to track  Visual Fields:  (able to detect stimuli in all fields)  Diplopia: No  Functional Mobility:  Bed Mobility:     Supine to Sit: Contact guard assistance        Transfers:  Sit to Stand: Minimum assistance  Stand to Sit: Minimum assistance        Bed to Chair: Minimum assistance              Balance:   Sitting: Impaired  Sitting - Static: Good (unsupported)  Sitting - Dynamic: Fair (occasional)  Standing: Impaired; With support  Standing - Static: Fair  Standing - Dynamic : Fair    Functional Measure:  Barthel Index:    Bathin  Bladder: 0  Bowels: 10  Groomin  Dressin  Feeding: 10  Mobility: 0  Stairs: 0  Toilet Use: 5  Transfer (Bed to Chair and Back): 10  Total: 45/100       The Barthel ADL Index: Guidelines  1. The index should be used as a record of what a patient does, not as a record of what a patient could do. 2. The main aim is to establish degree of independence from any help, physical or verbal, however minor and for whatever reason. 3. The need for supervision renders the patient not independent. 4. A patient's performance should be established using the best available evidence.  Asking the patient, friends/relatives and nurses are the usual sources, but direct observation and common sense are also important. However direct testing is not needed. 5. Usually the patient's performance over the preceding 24-48 hours is important, but occasionally longer periods will be relevant. 6. Middle categories imply that the patient supplies over 50 per cent of the effort. 7. Use of aids to be independent is allowed. Score Interpretation (from [address])    Independent   60-79 Minimally independent   40-59 Partially dependent   20-39 Very dependent   <20 Totally dependent     -Rahul Salazar., Barthel, D.W. (1965). Functional evaluation: the Barthel Index. [address] ([address] (1997). The Barthel activities of daily living index: self-reporting versus actual performance in the old (> or = 75 years). Journal of [address] [place] [address] 45(7), [facility], JGABRIELLAF, Jhon Stock., Spenser Ji. (1999). Measuring the change in disability after inpatient rehabilitation; comparison of the responsiveness of the Barthel Index and Functional [place] Measure. Journal of Neurology, Neurosurgery, and Psychiatry, 66(4), 310-467. Jian Matnilla, N.J.A, ANSELMO Flanagan, & Silvana Washington M.A. (2004) Assessment of post-stroke quality of life in cost-effectiveness studies: The usefulness of the Barthel Index and the EuroQoL-5D.  Quality of Life Research, 15, 650-56           Physical Therapy Evaluation Charge Determination   History Examination Presentation Decision-Making   HIGH Complexity :3+ comorbidities / personal factors will impact the outcome/ POC  HIGH Complexity : 4+ Standardized tests and measures addressing body structure, function, activity limitation and / or participation in recreation  LOW Complexity : Stable, uncomplicated  Other outcome measures Barthel  MEDIUM      Based on the above components, the patient evaluation is determined to be of the following complexity level: LOW Activity Tolerance:   Fair    After treatment patient left in no apparent distress:   Sitting in chair, Call bell within reach, and Bed / chair alarm activated    COMMUNICATION/EDUCATION:   The patients plan of care was discussed with: Occupational therapist, Registered nurse, and Case management. Fall prevention education was provided and the patient/caregiver indicated understanding., Patient/family have participated as able in goal setting and plan of care. , and Patient/family agree to work toward stated goals and plan of care.     Thank you for this referral.  Aditya Liriano, PT, DPT   Time Calculation: 24 mins

## 2021-12-09 NOTE — PROGRESS NOTES
SPEECH PATHOLOGY BEDSIDE SWALLOW EVALUATION/DISCHARGE  Patient: Ashlie Stevens (75 y.o. female)  Date: 12/9/2021  Primary Diagnosis: CVA (cerebral vascular accident) ([facility] [I63.9]       Precautions: none      ASSESSMENT :  Based on the objective data described below, the patient presents with no difficulty with swallowing on this eval. She denies difficulty at baseline and says she normally has a good appetite and does not need to avoid any foods. Excellent dentition and swallow skills for her age. Skilled acute therapy provided by a speech-language pathologist is not indicated at this time. PLAN :  Recommendations:  Regular diet-- will enter GI lite for now as was vomiting earlier  Upright for PO  Discharge Recommendations: None     SUBJECTIVE:   Patient stated Dorinda Trooval show is this? I usually watch the [address] [place] [address] right on time at 1230pm  OBJECTIVE:     Past Medical History:   Diagnosis Date    Diabetes ([facility]     High cholesterol     Hypercholesteremia     Hypertension      Past Surgical History:   Procedure Laterality Date    HX HYSTERECTOMY      HX ORTHOPAEDIC      bilateral rods in femurs    HX PACEMAKER       Prior Level of Function/Home Situation:   Home Situation  Support Systems: Other Family Member(s)  Diet prior to admission: unrestricted per patient   Current Diet:  NPO   Cognitive and Communication Status:  Neurologic State: Alert  Orientation Level: Oriented to situation  Cognition: Appropriate for age attention/concentration  Perception: Appears intact  Perseveration: No perseveration noted  Safety/Judgement: Awareness of environment  Oral Assessment:  Oral Assessment  Labial: No impairment  Dentition: Natural (impressive dentition for age)  Oral Hygiene: clean  Lingual: No impairment  Mandible: No impairment  P.O. Trials:  Patient Position: upright in bed with A  Vocal quality prior to P.O.:  (presbyphonia)  Consistency Presented: Puree; Solid;  Thin liquid  How Presented: SLP-fed/presented; Self-fed/presented; Cup/sip; Spoon        Bolus Formation/Control: No impairment     Propulsion: No impairment  Oral Residue: None     Laryngeal Elevation: Functional  Aspiration Signs/Symptoms: None  Pharyngeal Phase Characteristics: No impairment, issues, or problems          NOMS:   The NOMS functional outcome measure was used to quantify this patient's level of swallowing impairment. Based on the NOMS, the patient was determined to be at level 7 for swallow function     NOMS Swallowing Levels:  Level 1 (CN): NPO  Level 2 (CM): NPO but takes consistency in therapy  Level 3 (CL): Takes less than 50% of nutrition p.o. and continues with nonoral feedings; and/or safe with mod cues; and/or max diet restriction  Level 4 (CK): Safe swallow but needs mod cues; and/or mod diet restriction; and/or still requires some nonoral feeding/supplements  Level 5 (CJ): Safe swallow with min diet restriction; and/or needs min cues  Level 6 (CI): Independent with p.o.; rare cues; usually self cues; may need to avoid some foods or needs extra time  Level 7 ([address] [place] [address]): Independent for all p.o.  DENIZ. (2003). National Outcomes Measurement System (NOMS): Adult Speech-Language Pathology User's Guide. Pain:           After treatment:   Patient left in no apparent distress in bed and Call bell within reach    COMMUNICATION/EDUCATION:   Patient was educated regarding reasons for SLP eval and importance of upright posture for safe swallow. Patient expressed understanding and agreement. The patient's plan of care including recommendations, planned interventions, and recommended diet changes were discussed with: Registered nurse.      Thank you for this referral.  Devin Baker, SLP  Time Calculation: 14 mins

## 2021-12-09 NOTE — PROGRESS NOTES
Bedside shift change report given to [address] [place] [address] (oncoming nurse) by Rukhsana Lyle RN (offgoing nurse). Report included the following information SBAR, Kardex, OR Summary, Intake/Output, MAR, Cardiac Rhythm NSR, Alarm Parameters , Quality Measures and Dual Neuro Assessment.

## 2021-12-09 NOTE — PROCEDURES
ELECTROENCEPHALOGRAM REPORT     Patient Name: Jeff Thompson  : 3/30/1926  Age: 80 y.o. Ordering physician: ER  Date of EE2021  Interpreting physician: Dahlia Leger DO      PROCEDURE: EEG. CLINICAL INDICATION: The patient is a 80 y.o. female who is being evaluated for baseline electro cerebral activities and to rule out seizure focus. DESCRIPTION OF THE RECORD:   Throughout the recording there is global diffuse slowing more prominent on the left side. At times in the right posterior region there is a normal rhythm of about 8 Hz but not sustained. Throughout the recording, there were neither clear areas of focal spike or spike-and-wave discharges seen. During the recording there were generalized bursts of epileptiform acitivity. Hyperventilation was not performed. Photic stimulation produced a minimal driving response in the posterior head regions. During drowsiness, the background rhythms attenuate and are replaced with diffuse symmetric theta range activities. Later stages of sleep are not attained. INTERPRETATION:     This is an abnormal electroencephalogram showing diffuse slowing sometimes more prominent on the left, suggestive for global cortical dysfunction which is nonspecific and may be seen in underlying metabolic/infectious/inflammatory/structural processes. No clear focal abnormalities or epileptiform activity was seen. No seizures. Clinical correlation recommended.       2 [facility],   Diplomate, American Board of Psychiatry & Neurology (Neurology)

## 2021-12-09 NOTE — PROGRESS NOTES
TRANSFER - IN REPORT:    Verbal report received from Maritza Milan (name) on Kristy Garay  being received from [facility] ED (unit) for routine progression of care      Report consisted of patients Situation, Background, Assessment and   Recommendations(SBAR). Information from the following report(s) SBAR, Kardex, ED Summary, Intake/Output, MAR, Recent Results, Cardiac Rhythm NSR, Alarm Parameters  and Quality Measures was reviewed with the receiving nurse. Opportunity for questions and clarification was provided. Assessment completed upon patients arrival to unit and care assumed.

## 2021-12-09 NOTE — PROGRESS NOTES
Came to see patient, chart reviewed, she needs to stay NPO pending DARLIN this morning, will follow up when this is complete.          Aida Cavanaugh M.S., [address] [facility]  Speech-Language Pathologist

## 2021-12-09 NOTE — PROGRESS NOTES
Problem: Self Care Deficits Care Plan (Adult)  Goal: *Acute Goals and Plan of Care (Insert Text)  Description: FUNCTIONAL STATUS PRIOR TO ADMISSION: Patient received assistance with bathing but was otherwise modified independent with ADLs and functional mobility/ ambulatory with rollator. HOME SUPPORT: Patient resided with family, who assisted with IADLs    Occupational Therapy Goals  Initiated 12/9/2021  1. Patient will perform grooming standing at sink with supervision/set-up within 7 day(s). 2.  Patient will perform lower body dressing with supervision/set-up within 7 day(s). 3.  Patient will perform bathing with supervision/set-up within 7 day(s). 4.  Patient will perform toilet transfers with supervision/set-up within 7 day(s). 5.  Patient will perform all aspects of toileting with supervision/set-up within 7 day(s). 6.  Patient will participate in upper extremity therapeutic exercise/activities with supervision/set-up for 10 minutes within 7 day(s). 7.  Patient will utilize fall prevention techniques during functional activities with verbal cues within 7 day(s). Outcome: Not Met     OCCUPATIONAL THERAPY EVALUATION  Patient: Ramila Hussein (89 y.o. female)  Date: 12/9/2021  Primary Diagnosis: CVA (cerebral vascular accident) [facility]) [I63.9]        Precautions: fall       ASSESSMENT  Note patient admitted for CVA work-up d/t L neglect and L facial droop per chart with head CT negative and PPM not MRI compatible. Patient presents for OT evaluation with confusion, Ox2, generally impaired AROM/ strength/ coordination, and impaired balance. She attended to both sides of body/ environment with no focal weakness appreciated. Patient followed most simple commands and was eager to mobilize. She required minimum assistance for sit-stand/ pivot to chair. Note patient resided with multiple family members PTA. She is below functional baseline.   Recommend return home with [address] [place] [address]'[place] if family able to provide increased assistance (x1 assist with out of bed mobility and ADLs). If family unable to provide this level of support, recommend SNF. Current Level of Function Impacting Discharge (ADLs/self-care): Contact guard assistance supine-sit, minimum assistance for sit-stand/ pivot to chair. Infer overall up to moderate assistance ADLs    Functional Outcome Measure:  Unable to complete Fugl Ornelas UE motor assessment d/t impaired multi-step command following. Patient will benefit from skilled therapy intervention to address the above noted impairments. PLAN :  Recommendations and Planned Interventions: self care training, functional mobility training, therapeutic exercise, balance training, therapeutic activities, cognitive retraining, endurance activities, patient education, home safety training, and family training/education    Frequency/Duration: Patient will be followed by occupational therapy 5 times a week to address goals. Recommendation for discharge: (in order for the patient to meet his/her long term goals)  Recommend return home with [address] [place], [place] [address] if family able to provide increased assistance (x1 assist with out of bed mobility and ADLs). If family unable to provide this level of support, recommend SNF. This discharge recommendation:  Has been made in collaboration with the attending provider and/or case management         SUBJECTIVE:   Patient stated I can get up by myself.     OBJECTIVE DATA SUMMARY:   HISTORY:   Past Medical History:   Diagnosis Date    Diabetes ([facility] [address]     High cholesterol     Hypercholesteremia     Hypertension      Past Surgical History:   Procedure Laterality Date    HX HYSTERECTOMY      HX ORTHOPAEDIC      bilateral rods in femurs    HX PACEMAKER         Expanded or extensive additional review of patient history:     Home Situation  One/Two Story Residence: Two story, live on 1st floor  Lift Chair Available: Yes  Living Alone: No (with granddaughter + her family)  Support Systems: Other Family Member(s)  Current DME Used/Available at Home: [employer], rollator, Shower chair, Grab bars  Tub or Shower Type: Shower    EXAMINATION OF PERFORMANCE DEFICITS:  Cognitive/Behavioral Status:  Neurologic State: Alert  Orientation Level: Oriented to person; Oriented to situation; Disoriented to place; Disoriented to time  Cognition: Follows commands  Perception: Appears intact  Perseveration: No perseveration noted  Safety/Judgement: Awareness of environment    Skin: visible skin appears intact    Edema: none noted    Hearing: WDL    Vision/Perceptual:    Tracking: Able to track left of midline; Able to track right of midline; Requires cues, head turns, or add eye shifts to track              Visual Fields:  (able to detect stimuli in all fields)  Diplopia: No              Range of Motion:    AROM: Generally decreased, functional                         Strength:    Strength: Generally decreased, functional                Coordination:  Coordination: Generally decreased, functional  Fine Motor Skills-Upper: Left Impaired; Right Impaired    Gross Motor Skills-Upper: Left Impaired; Right Impaired    Tone & Sensation:    Tone: Normal  Sensation:  (reports intact, assessment inconsistent)                      Balance:  Sitting: Impaired  Sitting - Static: Good (unsupported)  Sitting - Dynamic: Fair (occasional)  Standing: Impaired; With support  Standing - Static: Fair  Standing - Dynamic : Fair    Functional Mobility and Transfers for ADLs:  Bed Mobility:  Supine to Sit: Contact guard assistance    Transfers:  Sit to Stand: Minimum assistance  Stand to Sit: Minimum assistance  Bed to Chair: Minimum assistance    ADL Assessment:  Feeding: Independent (inferred)    Oral Facial Hygiene/Grooming: Setup (inferred)    Bathing: Minimum assistance (inferred d/t reach, balance)    Upper Body Dressing: Setup (inferred)    Lower Body Dressing:  Moderate assistance (inferred for balance, reach, mobility)    Toileting: Moderate assistance (inferred)                ADL Intervention and task modifications:       Cognitive Retraining  Safety/Judgement: Awareness of environment      Functional Measure:    Barthel Index:  Bathin  Bladder: 0  Bowels: 10  Groomin  Dressin  Feeding: 10  Mobility: 0  Stairs: 0  Toilet Use: 5  Transfer (Bed to Chair and Back): 10  Total: 45/100      The Barthel ADL Index: Guidelines  1. The index should be used as a record of what a patient does, not as a record of what a patient could do. 2. The main aim is to establish degree of independence from any help, physical or verbal, however minor and for whatever reason. 3. The need for supervision renders the patient not independent. 4. A patient's performance should be established using the best available evidence. Asking the patient, friends/relatives and nurses are the usual sources, but direct observation and common sense are also important. However direct testing is not needed. 5. Usually the patient's performance over the preceding 24-48 hours is important, but occasionally longer periods will be relevant. 6. Middle categories imply that the patient supplies over 50 per cent of the effort. 7. Use of aids to be independent is allowed. Score Interpretation (from [address] [facility] [address])    Independent   60-79 Minimally independent   40-59 Partially dependent   20-39 Very dependent   <20 Totally dependent     -Rahul Salazar., Barthel, D.W. (1965). Functional evaluation: the Barthel Index. [facility] ([address] (1997). The Barthel activities of daily living index: self-reporting versus actual performance in the old (> or = 75 years). Journal of [address] [place] [address] 45(7), [address] [facility], JBERNARD, Lashawn Washington, Perri Joyner. (1999).  Measuring the change in disability after inpatient rehabilitation; comparison of the responsiveness of the Barthel Index and Functional [place] Measure. Journal of Neurology, Neurosurgery, and Psychiatry, 66(4), 547-405. KHANG Segura, ANSELMO Flanagan, & Jeff Godoy M.A. (2004) Assessment of post-stroke quality of life in cost-effectiveness studies: The usefulness of the Barthel Index and the EuroQoL-5D. Quality of Life Research, 15, 671-68         Occupational Therapy Evaluation Charge Determination   History Examination Decision-Making   LOW Complexity : Brief history review  MEDIUM Complexity : 3-5 performance deficits relating to physical, cognitive , or psychosocial skils that result in activity limitations and / or participation restrictions MEDIUM Complexity : Patient may present with comorbidities that affect occupational performnce. Miniml to moderate modification of tasks or assistance (eg, physical or verbal ) with assesment(s) is necessary to enable patient to complete evaluation       Based on the above components, the patient evaluation is determined to be of the following complexity level: LOW   Pain Rating:  Patient did not report pain    Activity Tolerance:   VSS, good    After treatment patient left in no apparent distress:    Sitting in chair, Call bell within reach, and Bed / chair alarm activated    COMMUNICATION/EDUCATION:   The patients plan of care was discussed with: Physical therapist, Registered nurse, and Case management. Home safety education was provided and the patient/caregiver indicated understanding., Patient/family have participated as able in goal setting and plan of care. , and Patient/family agree to work toward stated goals and plan of care. This patients plan of care is appropriate for delegation to [facility].     Thank you for this referral.  Familia Seals OT

## 2021-12-09 NOTE — PROGRESS NOTES
MRI safety note:    Ms. Loida Perales has a [employer] device, model # [id number] with lead model #s 1948-52cm and 2088TC-46cm placed on 7/31/2013. Information obtained from device wallet card scanned into pt media. This system is NOT MR-conditional, unable to proceed with MRI. Notified Pam Ybarra RN.

## 2021-12-09 NOTE — PROGRESS NOTES
Problem: Falls - Risk of  Goal: *Absence of Falls  Description: Document Chon Ester Fall Risk and appropriate interventions in the flowsheet.   Outcome: Progressing Towards Goal  Note: Fall Risk Interventions:  Mobility Interventions: Patient to call before getting OOB, PT Consult for mobility concerns, PT Consult for assist device competence    Mentation Interventions: Adequate sleep, hydration, pain control, Bed/chair exit alarm, More frequent rounding, Reorient patient    Medication Interventions: Bed/chair exit alarm, Evaluate medications/consider consulting pharmacy    Elimination Interventions: Bed/chair exit alarm, Call light in reach

## 2021-12-09 NOTE — PROGRESS NOTES
Transition of Care Plan: TBD-pending therapy/medical recs, patient currently lives with granddaughter and other family members    RUR: 11% low    PCP F/U: Dr. Adam Bee    Disposition: TBD    Transportation: TBD    Main Contact: granddaughter: Sheri Ring [phone]: Noted in previous CM notes that patient lives with granddaughter and other family members. Waiting on therapy/medical recs for discharge plan. Will continue to follow.      Harper Fisher RN, CRM

## 2021-12-09 NOTE — PROGRESS NOTES
CARDIOLOGY NOTE    Troponin trending down. OK to continue outpatient anticoagulant and other CV medications. TTE pending, suspect significant aortic stenosis. Poor candidate for procedural interventions given advanced age, comorbidities, dementia and frailty. ADDENDUM: Echo shows moderate aortic stenosis. No further inpatient CV testing or evaluation planned. Follow-up with primary cardiologist.    Thank you for the opportunity to participate in the care of Yinka Escalante and please do not hesitate to contact us should you have any questions. Edil Thurman, [facility] / [address] [place] [address] [facility]  12/09/21

## 2021-12-09 NOTE — ED NOTES
TRANSFER - OUT REPORT:    Verbal report given to Vasyl(name) on Darlyn Ramon  being transferred to [facility](unit) for routine progression of care       Report consisted of patients Situation, Background, Assessment and   Recommendations(SBAR). Information from the following report(s) SBAR, ED Summary, Intake/Output, MAR and Recent Results was reviewed with the receiving nurse. Lines:   Peripheral IV 12/08/21 Right Antecubital (Active)        Opportunity for questions and clarification was provided.       Patient transported with:   Monitor

## 2021-12-09 NOTE — CONSULTS
INPATIENT NEUROLOGY CONSULTATION  12/9/2021     Consulted by: Susanna Olson MD        Patient ID:  Masha Ivan  [id number]  80 y.o.  3/30/1926    CC: Weakness and confusion    HPI    42-year-old woman with diabetes and hypertension and dementia on Xarelto who has a pacemaker here because she developed unusual generalized weakness and some confusion. There was question of a left facial droop. Her blood glucose was low. She was brought to the hospital.  Her head CT was negative. She has elevated troponins ongoing. CTA negative. She has a pacemaker in place which is not compatible for MRI. When I speak to her she tells me she feels her baseline. EEG no seizures. Review of Systems   Unable to perform ROS: Dementia   Neurological: Positive for weakness. Past Medical History:   Diagnosis Date    Diabetes ([facility] [address]     High cholesterol     Hypercholesteremia     Hypertension      History reviewed. No pertinent family history. Social History     Socioeconomic History    Marital status:      Spouse name: Not on file    Number of children: Not on file    Years of education: Not on file    Highest education level: Not on file   Occupational History    Not on file   Tobacco Use    Smoking status: Never Smoker    Smokeless tobacco: Never Used   Substance and Sexual Activity    Alcohol use: No    Drug use: Not on file    Sexual activity: Not on file   Other Topics Concern    Not on file   Social History Narrative    Not on file     Social Determinants of Health     Financial Resource Strain:     Difficulty of Paying Living Expenses: Not on file   Food Insecurity:     Worried About Running Out of Food in the Last Year: Not on file    Jareth of Food in the Last Year: Not on file   Transportation Needs:     Lack of Transportation (Medical): Not on file    Lack of Transportation (Non-Medical):  Not on file   Physical Activity:     Days of Exercise per Week: Not on file    Minutes of Exercise per Session: Not on file   Stress:     Feeling of Stress : Not on file   Social Connections:     Frequency of Communication with Friends and Family: Not on file    Frequency of Social Gatherings with Friends and Family: Not on file    Attends Yarsanism Services: Not on file    Active Member of Clubs or Organizations: Not on file    Attends Club or Organization Meetings: Not on file    Marital Status: Not on file   Intimate Partner Violence:     Fear of Current or Ex-Partner: Not on file    Emotionally Abused: Not on file    Physically Abused: Not on file    Sexually Abused: Not on file   Housing Stability:     Unable to Pay for Housing in the Last Year: Not on file    Number of Places Lived in the Last Year: Not on file    Unstable Housing in the Last Year: Not on file     Current Facility-Administered Medications   Medication Dose Route Frequency    sotaloL (BETAPACE) tablet 40 mg  40 mg Oral DAILY    rivaroxaban (XARELTO) tablet 15 mg  15 mg Oral QPM    lisinopriL (PRINIVIL, ZESTRIL) tablet 40 mg  40 mg Oral DAILY    mirabegron ER (MYRBETRIQ) tablet 50 mg (Patient Supplied)  50 mg Oral DAILY    acetaminophen (TYLENOL) tablet 650 mg  650 mg Oral Q4H PRN    Or    acetaminophen (TYLENOL) solution 650 mg  650 mg Per NG tube Q4H PRN    Or    acetaminophen (TYLENOL) suppository 650 mg  650 mg Rectal Q4H PRN    aspirin (ASA) suppository 150 mg  150 mg Rectal DAILY    atorvastatin (LIPITOR) tablet 80 mg  80 mg Oral QHS    famotidine (PF) (PEPCID) 20 mg in 0.9% sodium chloride 10 mL injection  20 mg IntraVENous Q24H    glucose chewable tablet 16 g  4 Tablet Oral PRN    dextrose (D50W) injection syrg 12.5-25 g  25-50 mL IntraVENous PRN    glucagon (GLUCAGEN) injection 1 mg  1 mg IntraMUSCular PRN    cefTRIAXone (ROCEPHIN) 1 g in 0.9% sodium chloride 10 mL IV syringe  1 g IntraVENous Q24H    dextrose 5% and 0.9% NaCl infusion  75 mL/hr IntraVENous CONTINUOUS     Allergies   Allergen Reactions    Hydrocodone Unknown (comments)       Visit Vitals  BP (!) 123/44 (BP 1 Location: Left upper arm, BP Patient Position: At rest)   Pulse 66   Temp 98 °F (36.7 °C)   Resp 18   Ht 4' 11.84\" (1.52 m) Comment: From previous encounter   Wt 219 lb 5.7 oz (99.5 kg)   SpO2 99%   BMI 43.07 kg/m²     Physical Exam  Vitals and nursing note reviewed. Constitutional:       Appearance: Normal appearance. Neurological:      Mental Status: She is alert. Neurologic Exam     Mental Status   Elderly pleasant woman awake. Very hearing impaired. She thinks she is at [facility] but she knows it is December. She is not sure why she is here. She denies any history of seizure. She is following commands easily. Her pupils are equal reactive with a conjugate gaze, she has a very subtle flattening of the left lower face but speech is clear with a [place] accent. Tongue is midline. Strength supine upper extremities 5/5 bilateral lower extremities I would grade 4+ age-related  No abnormal movements  Gait deferred            Lab Results   Component Value Date/Time    WBC 17.6 (H) 12/09/2021 05:06 AM    HGB 14.5 12/09/2021 05:06 AM    HCT 44.4 12/09/2021 05:06 AM    PLATELET 525 08/39/9661 05:06 AM    MCV 96.9 12/09/2021 05:06 AM     Lab Results   Component Value Date/Time    Hemoglobin A1c 6.2 (H) 12/09/2021 05:06 AM    Hemoglobin A1c 5.9 (H) 12/08/2021 09:38 AM    Glucose 72 12/09/2021 05:06 AM    Glucose (POC) 87 12/09/2021 07:16 AM    LDL, calculated 67.2 12/09/2021 05:06 AM    Creatinine 1.16 (H) 12/09/2021 05:06 AM      Lab Results   Component Value Date/Time    Cholesterol, total 166 12/09/2021 05:06 AM    HDL Cholesterol 77 12/09/2021 05:06 AM    LDL, calculated 67.2 12/09/2021 05:06 AM    Triglyceride 109 12/09/2021 05:06 AM    CHOL/HDL Ratio 2.2 12/09/2021 05:06 AM     Lab Results   Component Value Date/Time    ALT (SGPT) 48 12/08/2021 09:38 AM    Alk.  phosphatase 91 12/08/2021 09:38 AM    Bilirubin, total 0.3 12/08/2021 09:38 AM    Albumin 3.6 12/08/2021 09:38 AM    Protein, total 8.5 (H) 12/08/2021 09:38 AM    INR 1.0 12/08/2021 09:38 AM    Prothrombin time 10.9 12/08/2021 09:38 AM    PLATELET 993 61/00/2839 05:06 AM        CT Results (maximum last 3): Results from [place] encounter on 12/08/21    CT ABD PELV WO CONT    Narrative  EXAM: CT ABD PELV WO CONT    INDICATION: confused, hypoglycemic, vomiting, abd pain. COMPARISON: None    CONTRAST:  None. TECHNIQUE:  Thin axial images were obtained through the abdomen and pelvis. Coronal and  sagittal reformats were generated. Oral contrast was not administered. CT dose  reduction was achieved through use of a standardized protocol tailored for this  examination and automatic exposure control for dose modulation. The absence of intravenous contrast material reduces the sensitivity for  evaluation of the vasculature and solid organs. FINDINGS:  LOWER THORAX: A pacemaker is in place. LIVER: No mass. BILIARY TREE: Status post cholecystectomy. CBD is not dilated. SPLEEN: within normal limits. PANCREAS: Several calcifications likely related to prior pancreatitis. No  evidence of acute pancreatitis. No ductal dilatation. ADRENALS: Unremarkable. KIDNEYS/URETERS: No calculus or hydronephrosis. STOMACH: Mild hiatal hernia. SMALL BOWEL: No dilatation or wall thickening. COLON: No dilatation or wall thickening. Numerous colonic diverticula. No  evidence of diverticulitis. APPENDIX: Unremarkable. PERITONEUM: No ascites or pneumoperitoneum. RETROPERITONEUM: No lymphadenopathy or aortic aneurysm. REPRODUCTIVE ORGANS: Status post hysterectomy. URINARY BLADDER: No mass or calculus. BONES: Status post ORIF of the bilateral proximal femurs. ABDOMINAL WALL: No mass or hernia. ADDITIONAL COMMENTS: N/A    Impression  No evidence of acute process.       CT CODE NEURO PERF W CBF    Narrative  EXAM:  CTA CODE NEURO HEAD AND NECK W CONT, CT CODE NEURO PERF W CBF    INDICATION:   Code Stroke    COMPARISON:  CT head 12/8/2021. CONTRAST:  120 mL of Isovue-370. TECHNIQUE:  Unenhanced  images were obtained to localize the volume for  acquisition. Multislice helical axial CT angiography was performed from the  aortic arch to the top of the head during uneventful rapid bolus intravenous  contrast administration. Coronal and sagittal reformations and 3D post  processing was performed. CT dose reduction was achieved through use of a  standardized protocol tailored for this examination and automatic exposure  control for dose modulation. CT brain perfusion was performed with generation of hemodynamic maps of multiple  parameters, including cerebral blood flow, cerebral blood volume, and MTT (mean  transit time). CT dose reduction was achieved through use of a standardized  protocol tailored for this examination and automatic exposure control for dose  modulation. This study was analyzed by the [address]  algorithm. FINDINGS:    CTA Head:  There is no evidence of large vessel occlusion or flow-limiting stenosis of the  intracranial internal carotid, anterior cerebral, and middle cerebral arteries. Calcification of the bilateral carotid siphons without significant stenosis. The  anterior communicating artery is patent. There is no evidence of large vessel occlusion or flow-limiting stenosis of the  intracranial vertebral arteries, basilar artery, or posterior cerebral arteries. The posterior communicating arteries are large and patent. There is no evidence of aneurysm or vascular malformation. The dural venous  sinuses and deep cerebral venous system are patent. No evidence of abnormal  enhancement on delayed phase images. CTA NECK:  NASCET method was utilized for calculating stenosis. Mild calcific atherosclerosis of the aortic arch. The common carotid arteries  demonstrate no significant stenosis.  Mild calcific atherosclerosis of the right  carotid bifurcation without significant stenosis. Mild calcific atherosclerosis  of the left carotid bifurcation without significant stenosis. There is a left dominant vertebrobasilar arterial system. The cervical vertebral  arteries are normal in course, size and contour without significant stenosis. Visualized soft tissues of the neck are unremarkable. Visualized lung apices are  clear. Left chest cardiac device noted. No acute fracture or aggressive osseous  lesion. Multilevel degenerative disc disease in the cervical spine, most  advanced at C5-C6. CT Brain Perfusion:  There are no regional areas of elevated Tmax, decreased cerebral blood flow or  blood volume. rCBF < 30% = 0 cc. Tmax > 6 seconds = 0 cc. Impression  CTA Head:  1. No evidence of significant stenosis or aneurysm. CTA Neck:  1. No evidence of significant stenosis. CT Brain Perfusion:  1. No acute abnormality. MRI Results (maximum last 3): No results found for this or any previous visit. VAS/US/Carotid Doppler Results (maximum last 3): No results found for this or any previous visit. PET Results (maximum last 3): No results found for this or any previous visit. Assessment and Plan        80year-old woman who presented with weakness and confusion with question of left facial droop. On exam she does have a slight asymmetry on the left. I do not know if this is baseline in her case. MRI cannot be done. CTA reassuring and no vascular acute issue. She is on Xarelto already. I think this was likely a TIA but could have been provoked due to hypoglycemia and cardiac dysfunction. I think adding a low-dose aspirin would be appropriate in this case. No change to Xarelto. Stroke work-up with echo. Rehab needs. This clinical note was dictated with an electronic dictation software that can make unintentional errors.   If there are any questions, please contact me directly for clarification.       [address] [place] [address],   NEUROLOGIST  Diplomate ABPN  12/9/2021

## 2021-12-10 NOTE — DISCHARGE SUMMARY
Discharge Summary     Patient:  Ana Sheffield       MRN: [id number]       YOB: 1926       Age: 80 y.o. Date of admission:  12/8/2021    Date of discharge:  12/10/2021    Primary care provider: Dr. Will Wilkinson MD    Admitting provider:  Percy Li MD    Discharging provider:  Eliazar Garcia MD - [phone]  If unavailable, call [phone] and ask the  to page the triage hospitalist.    Consultations  · ED CONSULT TO [facility]  · ED CONSULT TO [facility] [address]  · IP CONSULT TO NEUROLOGY  · IP CONSULT TO CARDIOLOGY  · IP CONSULT TO CARDIOLOGY    Procedures  · * No surgery found *    Discharge destination: HOME with [facility]. The patient is stable for discharge. Admission diagnosis  · TIA  · UTI  · Hypoglycemia    Current Discharge Medication List      START taking these medications    Details   aspirin delayed-release 81 mg tablet Take 1 Tablet by mouth daily. Qty: 30 Tablet, Refills: 11  Start date: 12/11/2021      amoxicillin-clavulanate (Augmentin) 875-125 mg per tablet Take 1 Tablet by mouth two (2) times a day. Qty: 10 Tablet, Refills: 0  Start date: 12/10/2021         CONTINUE these medications which have NOT CHANGED    Details   cranberry fruit concentrate (Azo Cranberry) 250 mg chew Take  by mouth daily. ramipriL (Altace) 10 mg capsule Take 10 mg by mouth daily. potassium chloride SR (MICRO K 8) 8 mEq capsule Take 8 mEq by mouth two (2) times a day. rivaroxaban (XARELTO) 15 mg tab tablet Take 15 mg by mouth daily. mirabegron ER (Myrbetriq) 50 mg ER tablet Take 50 mg by mouth daily. SITagliptin (Januvia) 50 mg tablet Take 50 mg by mouth daily. sotaloL (BETAPACE) 80 mg tablet Take 40 mg by mouth daily. insulin glargine (Lantus Solostar U-100 Insulin) 100 unit/mL (3 mL) inpn 30 Units by SubCUTAneous route daily.  0500 every am.      insulin aspart U-100 (NovoLOG Flexpen U-100 Insulin) 100 unit/mL (3 mL) inpn by SubCUTAneous route. Used on an emergency base only to help lower blood glucose. Follow-up Information     Follow up With Specialties Details Why Contact Info    Keyla Segal MD Family Medicine In 1 week Discharge follow up  [address]  [phone]            Final discharge diagnoses and brief hospital course  Please also refer to the admission H&P for details on the presenting problem.     Left sided facial droop  - possible TIA  - unable to get MRI due to PPM   - added ASA to Xarelto  - c/w PT/OT/Speech  - LDL at goal, no statin added     ACute Metabolic Encephalopathy   - due to Hypoglycemia and UTI  - resolved     UTI   - was on rocephin  - UCx: E.Coli  - c/w Augmentin for 5 more days      Paroxysmal Atrial flutter  - c/w Sotalol, Xarelto     HTN  - c/w Lisinopril     DM type 2  - not on meds  - A1c 6.2  - watch off meds     Elevated Troponin, likely NSTEMI type 2   - ECHO no WMA, mild/mod AS   - not a good candidate for aggressive work up   - no symptoms , trop trending down     FOLLOW-UP TESTS recommended:   - Take Aspirin 81mg daily  - Take Augmentin for 5 days for UTI     PENDING TEST RESULTS:  At the time of your discharge the following test results are still pending: NONE  Please make sure you review these results with your outpatient follow-up provider(s).     SYMPTOMS to watch for: chest pain, shortness of breath, fever, chills, nausea, vomiting, diarrhea, change in mentation, falling, weakness, bleeding.     DIET/what to eat:  Diabetic Diet     ACTIVITY:  Activity as tolerated     WOUND CARE: NONE     EQUIPMENT needed:  NONE      Physical examination at discharge  Visit Vitals  /72 (BP 1 Location: Left lower arm, BP Patient Position: At rest;Sitting)   Pulse 60   Temp 98.4 °F (36.9 °C)   Resp 18   Ht 4' 11\" (1.499 m)   Wt 99.5 kg (219 lb 5.7 oz)   SpO2 97%   BMI 44.30 kg/m²     Awake and alert  Lung clear  CVS: RRR  Abd: soft NT ND   Ext: mild edema  Neuro: Alert and awake, no facial asymmetry, clear speech, motor 5/5, sensory intact     Pertinent imaging studies:  ECHO  · Image quality for this study was technically difficult. · Echo study was technically difficulty. · LV: Estimated LVEF is 60 - 65%. Normal cavity size, wall thickness, systolic function (ejection fraction normal) and diastolic function. Wall motion: normal.  · IAS: No atrial septal defect present. · AV: Moderate aortic valve leaflet calcification present with reduced excursion. Aortic valve mean gradient is 22 mmHg. Aortic valve area is 1.2 cm2. Mild to moderate aortic valve stenosis is present. · MV: Mild mitral annular calcification. Recent Labs     12/09/21  0506 12/08/21  0938   WBC 17.6* 18.2*   HGB 14.5 18.1*   HCT 44.4 55.1*    225     Recent Labs     12/09/21  0506 12/08/21  0938   * 137   K HEMOLYZED,RECOLLECT REQUESTED 3.6    105   CO2 25 29   BUN 26* 26*   CREA 1.16* 1.20*   GLU 72 89   CA 9.4 10.0     Recent Labs     12/08/21  0938   AP 91   TP 8.5*   ALB 3.6   GLOB 4.9*     Recent Labs     12/08/21  0938   INR 1.0   PTP 10.9      No results for input(s): FE, TIBC, PSAT, FERR in the last 72 hours. No results for input(s): PH, PCO2, PO2 in the last 72 hours. No results for input(s): CPK, CKMB in the last 72 hours. No lab exists for component: TROPONINI  No components found for: [place]    Chronic Diagnoses:    Problem List as of 12/10/2021 Never Reviewed          Codes Class Noted - Resolved    CVA (cerebral vascular accident) [facility]) ICD-10-CM: I63.9  ICD-9-CM: 434.91  12/8/2021 - Present              Time spent on discharge related activities today greater than 30 minutes.       Signed:  Manny Torres MD                 Hospitalist, Internal Medicine      Cc: Liu Keene MD

## 2021-12-10 NOTE — PROGRESS NOTES
Problem: Falls - Risk of  Goal: *Absence of Falls  Description: Document Donis Rudolph Fall Risk and appropriate interventions in the flowsheet.   Outcome: Progressing Towards Goal  Note: Fall Risk Interventions:  Mobility Interventions: Patient to call before getting OOB, Communicate number of staff needed for ambulation/transfer, Bed/chair exit alarm    Mentation Interventions: Adequate sleep, hydration, pain control, Increase mobility, Bed/chair exit alarm    Medication Interventions: Bed/chair exit alarm, Patient to call before getting OOB    Elimination Interventions: Bed/chair exit alarm              Problem: Patient Education: Go to Patient Education Activity  Goal: Patient/Family Education  Outcome: Progressing Towards Goal     Problem: Patient Education: Go to Patient Education Activity  Goal: Patient/Family Education  Outcome: Progressing Towards Goal     Problem: Patient Education: Go to Patient Education Activity  Goal: Patient/Family Education  Outcome: Progressing Towards Goal

## 2021-12-10 NOTE — ROUTINE PROCESS
Bedside and Verbal shift change report given to Jim Nolan RN (oncoming nurse) by Joana Rodriguez RN (offgoing nurse). Report included the following information SBAR, Kardex, MAR, Cardiac Rhythm irregular rhythm and Dual Neuro Assessment.

## 2021-12-10 NOTE — DISCHARGE INSTRUCTIONS
Please bring this form with you to show your primary care provider at your follow-up appointment. Primary care provider:  Dr. Radha Dailey MD    Discharging provider:  Ricardo Dinh MD    You have been admitted to the hospital with the following diagnoses:  - TIA  - UTI   - Hypoglycemia   FOLLOW-UP CARE RECOMMENDATIONS:    APPOINTMENTS:  Follow-up Information     Follow up With Specialties Details Why Contact Info    Radha Dailey MD Family Medicine In 1 week Discharge follow up   47-7  [phone]              FOLLOW-UP TESTS recommended:   - Take Aspirin 81mg daily  - Take Augmentin for 5 days for UTI    PENDING TEST RESULTS:  At the time of your discharge the following test results are still pending: NONE  Please make sure you review these results with your outpatient follow-up provider(s). SYMPTOMS to watch for: chest pain, shortness of breath, fever, chills, nausea, vomiting, diarrhea, change in mentation, falling, weakness, bleeding. DIET/what to eat:  Diabetic Diet    ACTIVITY:  Activity as tolerated    WOUND CARE: NONE    EQUIPMENT needed:  NONE      What to do if new or unexpected symptoms occur? If you experience any of the above symptoms (or should other concerns or questions arise after discharge) please call your primary care physician. Return to the emergency room if you cannot get hold of your doctor. · It is very important that you keep your follow-up appointment(s). · Please bring discharge papers, medication list (and/or medication bottles) to your follow-up appointments for review by your outpatient provider(s). · Please check the list of medications and be sure it includes every medication (even non-prescription medications) that your provider wants you to take. · It is important that you take the medication exactly as they are prescribed.    · Keep your medication in the bottles provided by the pharmacist and keep a list of the medication names, dosages, and times to be taken in your wallet. · Do not take other medications without consulting your doctor. · If you have any questions about your medications or other instructions, please talk to your nurse or care provider before you leave the hospital.    I understand that if any problems occur once I am at home I am to contact my physician. These instructions were explained to me and I had the opportunity to ask questions.

## 2021-12-10 NOTE — PROGRESS NOTES
Transition of Care Plan: Home with Home health-[facility] accepted     RUR: 11% low     PCP F/U: -Dianna Stallings, CATARINA-01/03/2021-10:00am     Disposition: Home with Home Health     Transportation: Family     Main Contact: davictoria: Linda Green [phone]: This CM will need to secure [facility] prior to discharge. Attempted to reach granddaughter, but unable to reach. 1424: Spoke with patient at bedside. States granddaughter is at work and will pick her up when she gets off. Agreeable to Home Health. [facility] accepted. Updated AVS.    1538: Mildred Khan is out of the office. New PCP appointment scheduled. Updated AVS    Shannon Norman RN, CRM     Transition of Care Plan:     The Plan for Transition of Care is related to the following treatment goals: Home Health    The Patient and/or patient representative was provided with a choice of provider and agrees with the discharge plan. Yes [x] No []    A Freedom of choice list was provided with basic dialogue that supports the patient's individualized plan of care/goals and shares the quality data associated with the providers. Yes [x] No []    Medicare pt has received, reviewed, and signed 2nd IM letter informing them of their right to appeal the discharge. Signed copy has been placed on pt bedside chart.

## 2021-12-11 NOTE — ROUTINE PROCESS
Bedside RN performed patient education and medication education. Discharge concerns initiated and discussed with patient, including clarification on \"who\" assists the patient at their home and instructions for when the home going patient should call their provider after discharge. Opportunity for questions and clarification was provided. Patient receptive to education: YES and caregiver  Patient stated:   Barriers to Education: [facility]  Diagnosis Education given:  YES    Length of stay: 2  Expected Day of Discharge: 12/10/21  Ask if they have \"Help at Home\" & add to white board?   YES    Education Day #: 2    Medication Education Given:  YES  M in the box Medication name: aspirin    Pt aware of HCAHPS survey: NO    Stroke Education documented in Patient Education: YES  Core Measures Documented in [facility]:  Risk Factors: YES  Warning signs of stroke: YES  When to Activate 911: YES  Medication Education for Risk Factors: YES  Smoking cessation if applicable: YES  Written Education Given:  YES    Discharge NIH Completed: YES  Score: 0    BRAINS: NO    Follow Up Appointment Made: YES  Date/Time if applicable: 1/3 10 am

## 2021-12-11 NOTE — ROUTINE PROCESS
Discharge medications reviewed with patient and caregiver and appropriate educational materials and side effects teaching were provided. I have reviewed discharge instructions with the patient and caregiver. The patient and caregiver verbalized understanding. Patient discharged home via family with personal belongings.

## 2021-12-11 NOTE — PROGRESS NOTES
Problem: Falls - Risk of  Goal: *Absence of Falls  Description: Document Kailash Chan Fall Risk and appropriate interventions in the flowsheet.   Outcome: Resolved/Met  Note: Fall Risk Interventions:  Mobility Interventions: OT consult for ADLs, Patient to call before getting OOB, Communicate number of staff needed for ambulation/transfer    Mentation Interventions: Adequate sleep, hydration, pain control, Bed/chair exit alarm    Medication Interventions: Bed/chair exit alarm, Patient to call before getting OOB, Teach patient to arise slowly    Elimination Interventions: Bed/chair exit alarm, Call light in reach              Problem: Patient Education: Go to Patient Education Activity  Goal: Patient/Family Education  Outcome: Resolved/Met     Problem: Patient Education: Go to Patient Education Activity  Goal: Patient/Family Education  Outcome: Resolved/Met     Problem: Patient Education: Go to Patient Education Activity  Goal: Patient/Family Education  Outcome: Resolved/Met     Problem: Patient Education: Go to Patient Education Activity  Goal: Patient/Family Education  Outcome: Resolved/Met     Problem: TIA/CVA Stroke: 0-24 hours  Goal: Off Pathway (Use only if patient is Off Pathway)  Outcome: Resolved/Met  Goal: Activity/Safety  Outcome: Resolved/Met  Goal: Consults, if ordered  Outcome: Resolved/Met  Goal: Diagnostic Test/Procedures  Outcome: Resolved/Met  Goal: Nutrition/Diet  Outcome: Resolved/Met  Goal: Discharge Planning  Outcome: Resolved/Met  Goal: Medications  Outcome: Resolved/Met  Goal: Respiratory  Outcome: Resolved/Met  Goal: Treatments/Interventions/Procedures  Outcome: Resolved/Met  Goal: Minimize risk of bleeding post-thrombolytic infusion  Outcome: Resolved/Met  Goal: Monitor for complications post-thrombolytic infusion  Outcome: Resolved/Met  Goal: Psychosocial  Outcome: Resolved/Met  Goal: *Hemodynamically stable  Outcome: Resolved/Met  Goal: *Neurologically stable  Description: Absence of additional neurological deficits    Outcome: Resolved/Met  Goal: *Verbalizes anxiety and depression are reduced or absent  Outcome: Resolved/Met  Goal: *Absence of Signs of Aspiration on Current Diet  Outcome: Resolved/Met  Goal: *Absence of deep venous thrombosis signs and symptoms(Stroke Metric)  Outcome: Resolved/Met  Goal: *Ability to perform ADLs and demonstrates progressive mobility and function  Outcome: Resolved/Met  Goal: *Stroke education started(Stroke Metric)  Outcome: Resolved/Met  Goal: *Dysphagia screen performed(Stroke Metric)  Outcome: Resolved/Met  Goal: *Rehab consulted(Stroke Metric)  Outcome: Resolved/Met     Problem: TIA/CVA Stroke: Day 2 Until Discharge  Goal: Off Pathway (Use only if patient is Off Pathway)  Outcome: Resolved/Met  Goal: Activity/Safety  Outcome: Resolved/Met  Goal: Diagnostic Test/Procedures  Outcome: Resolved/Met  Goal: Nutrition/Diet  Outcome: Resolved/Met  Goal: Discharge Planning  Outcome: Resolved/Met  Goal: Medications  Outcome: Resolved/Met  Goal: Respiratory  Outcome: Resolved/Met  Goal: Treatments/Interventions/Procedures  Outcome: Resolved/Met  Goal: Psychosocial  Outcome: Resolved/Met  Goal: *Verbalizes anxiety and depression are reduced or absent  Outcome: Resolved/Met  Goal: *Absence of aspiration  Outcome: Resolved/Met  Goal: *Absence of deep venous thrombosis signs and symptoms(Stroke Metric)  Outcome: Resolved/Met  Goal: *Optimal pain control at patient's stated goal  Outcome: Resolved/Met  Goal: *Tolerating diet  Outcome: Resolved/Met  Goal: *Ability to perform ADLs and demonstrates progressive mobility and function  Outcome: Resolved/Met  Goal: *Stroke education continued(Stroke Metric)  Outcome: Resolved/Met     Problem: Ischemic Stroke: Discharge Outcomes  Goal: *Verbalizes anxiety and depression are reduced or absent  Outcome: Resolved/Met  Goal: *Verbalize understanding of risk factor modification(Stroke Metric)  Outcome: Resolved/Met  Goal: *Hemodynamically stable  Outcome: Resolved/Met  Goal: *Absence of aspiration pneumonia  Outcome: Resolved/Met  Goal: *Aware of needed dietary changes  Outcome: Resolved/Met  Goal: *Verbalize understanding of prescribed medications including anti-coagulants, anti-lipid, and/or anti-platelets(Stroke Metric)  Outcome: Resolved/Met  Goal: *Tolerating diet  Outcome: Resolved/Met  Goal: *Aware of follow-up diagnostics related to anticoagulants  Outcome: Resolved/Met  Goal: *Ability to perform ADLs and demonstrates progressive mobility and function  Outcome: Resolved/Met  Goal: *Absence of DVT(Stroke Metric)  Outcome: Resolved/Met  Goal: *Absence of aspiration  Outcome: Resolved/Met  Goal: *Optimal pain control at patient's stated goal  Outcome: Resolved/Met  Goal: *Home safety concerns addressed  Outcome: Resolved/Met  Goal: *Describes available resources and support systems  Outcome: Resolved/Met  Goal: *Verbalizes understanding of activation of EMS(911) for stroke symptoms(Stroke Metric)  Outcome: Resolved/Met  Goal: *Understands and describes signs and symptoms to report to providers(Stroke Metric)  Outcome: Resolved/Met  Goal: *Neurolgocially stable (absence of additional neurological deficits)  Outcome: Resolved/Met  Goal: *Verbalizes importance of follow-up with primary care physician(Stroke Metric)  Outcome: Resolved/Met  Goal: *Smoking cessation discussed,if applicable(Stroke Metric)  Outcome: Resolved/Met  Goal: *Depression screening completed(Stroke Metric)  Outcome: Resolved/Met

## 2022-01-01 ENCOUNTER — HOME CARE VISIT (OUTPATIENT)
Dept: HOSPICE | Facility: HOSPICE | Age: 87
End: 2022-01-01
Payer: MEDICARE

## 2022-01-01 ENCOUNTER — HOME CARE VISIT (OUTPATIENT)
Dept: SCHEDULING | Facility: HOME HEALTH | Age: 87
End: 2022-01-01
Payer: MEDICARE

## 2022-01-01 ENCOUNTER — HOSPITAL ENCOUNTER (EMERGENCY)
Age: 87
Discharge: HOME | End: 2022-02-11
Attending: EMERGENCY MEDICINE | Admitting: EMERGENCY MEDICINE
Payer: MEDICARE

## 2022-01-01 ENCOUNTER — APPOINTMENT (OUTPATIENT)
Dept: GENERAL RADIOLOGY | Age: 87
End: 2022-01-01
Attending: EMERGENCY MEDICINE
Payer: MEDICARE

## 2022-01-01 ENCOUNTER — HOSPICE ADMISSION (OUTPATIENT)
Dept: HOSPICE | Facility: HOSPICE | Age: 87
End: 2022-01-01
Payer: MEDICARE

## 2022-01-01 VITALS
OXYGEN SATURATION: 96 % | DIASTOLIC BLOOD PRESSURE: 54 MMHG | SYSTOLIC BLOOD PRESSURE: 114 MMHG | RESPIRATION RATE: 20 BRPM | HEART RATE: 72 BPM | TEMPERATURE: 97.5 F | BODY MASS INDEX: 43.07 KG/M2 | HEIGHT: 60 IN | WEIGHT: 219.36 LBS

## 2022-01-01 VITALS — HEART RATE: 96 BPM | RESPIRATION RATE: 22 BRPM | TEMPERATURE: 98.9 F

## 2022-01-01 VITALS
SYSTOLIC BLOOD PRESSURE: 119 MMHG | RESPIRATION RATE: 18 BRPM | TEMPERATURE: 97.5 F | HEART RATE: 102 BPM | DIASTOLIC BLOOD PRESSURE: 82 MMHG

## 2022-01-01 VITALS
RESPIRATION RATE: 18 BRPM | TEMPERATURE: 97.3 F | HEART RATE: 76 BPM | DIASTOLIC BLOOD PRESSURE: 76 MMHG | SYSTOLIC BLOOD PRESSURE: 115 MMHG

## 2022-01-01 VITALS
RESPIRATION RATE: 18 BRPM | OXYGEN SATURATION: 94 % | HEART RATE: 74 BPM | DIASTOLIC BLOOD PRESSURE: 51 MMHG | SYSTOLIC BLOOD PRESSURE: 115 MMHG

## 2022-01-01 VITALS
TEMPERATURE: 99.1 F | DIASTOLIC BLOOD PRESSURE: 84 MMHG | HEART RATE: 80 BPM | SYSTOLIC BLOOD PRESSURE: 146 MMHG | RESPIRATION RATE: 18 BRPM

## 2022-01-01 DIAGNOSIS — M54.50 MIDLINE LOW BACK PAIN, UNSPECIFIED CHRONICITY, UNSPECIFIED WHETHER SCIATICA PRESENT: ICD-10-CM

## 2022-01-01 DIAGNOSIS — R41.82 ALTERED MENTAL STATUS, UNSPECIFIED ALTERED MENTAL STATUS TYPE: Primary | ICD-10-CM

## 2022-01-01 DIAGNOSIS — R47.9 SPEECH DISTURBANCE, UNSPECIFIED TYPE: ICD-10-CM

## 2022-01-01 DIAGNOSIS — K92.2 GASTROINTESTINAL HEMORRHAGE, UNSPECIFIED GASTROINTESTINAL HEMORRHAGE TYPE: ICD-10-CM

## 2022-01-01 LAB
ALBUMIN SERPL-MCNC: 2.9 G/DL (ref 3.5–5)
ALBUMIN/GLOB SERPL: 0.8 {RATIO} (ref 1.1–2.2)
ALP SERPL-CCNC: 104 U/L (ref 45–117)
ALT SERPL-CCNC: 28 U/L (ref 12–78)
ANION GAP SERPL CALC-SCNC: 2 MMOL/L (ref 5–15)
APPEARANCE UR: CLEAR
APTT PPP: 30.2 SEC (ref 22.1–31)
AST SERPL-CCNC: 34 U/L (ref 15–37)
BASOPHILS # BLD: 0 K/UL (ref 0–0.1)
BASOPHILS NFR BLD: 0 % (ref 0–1)
BILIRUB SERPL-MCNC: 0.5 MG/DL (ref 0.2–1)
BILIRUB UR QL: NEGATIVE
BUN SERPL-MCNC: 16 MG/DL (ref 6–20)
BUN/CREAT SERPL: 16 (ref 12–20)
CALCIUM SERPL-MCNC: 9 MG/DL (ref 8.5–10.1)
CALCULATED R AXIS, ECG10: -3 DEGREES
CALCULATED T AXIS, ECG11: 46 DEGREES
CHLORIDE SERPL-SCNC: 102 MMOL/L (ref 97–108)
CO2 SERPL-SCNC: 34 MMOL/L (ref 21–32)
COLOR UR: NORMAL
COMMENT, HOLDF: NORMAL
COVID-19 RAPID TEST, COVR: NOT DETECTED
CREAT SERPL-MCNC: 0.98 MG/DL (ref 0.55–1.02)
DIAGNOSIS, 93000: NORMAL
DIFFERENTIAL METHOD BLD: ABNORMAL
EOSINOPHIL # BLD: 0 K/UL (ref 0–0.4)
EOSINOPHIL NFR BLD: 0 % (ref 0–7)
ERYTHROCYTE [DISTWIDTH] IN BLOOD BY AUTOMATED COUNT: 12.9 % (ref 11.5–14.5)
GLOBULIN SER CALC-MCNC: 3.7 G/DL (ref 2–4)
GLUCOSE SERPL-MCNC: 69 MG/DL (ref 65–100)
GLUCOSE UR STRIP.AUTO-MCNC: NEGATIVE MG/DL
HCT VFR BLD AUTO: 36.3 % (ref 35–47)
HEMOCCULT STL QL: POSITIVE
HGB BLD-MCNC: 11.6 G/DL (ref 11.5–16)
HGB UR QL STRIP: NEGATIVE
IMM GRANULOCYTES # BLD AUTO: 0.2 K/UL (ref 0–0.04)
IMM GRANULOCYTES NFR BLD AUTO: 1 % (ref 0–0.5)
INR PPP: 1 (ref 0.9–1.1)
KETONES UR QL STRIP.AUTO: NEGATIVE MG/DL
LEUKOCYTE ESTERASE UR QL STRIP.AUTO: NEGATIVE
LYMPHOCYTES # BLD: 1.4 K/UL (ref 0.8–3.5)
LYMPHOCYTES NFR BLD: 7 % (ref 12–49)
MCH RBC QN AUTO: 31.7 PG (ref 26–34)
MCHC RBC AUTO-ENTMCNC: 32 G/DL (ref 30–36.5)
MCV RBC AUTO: 99.2 FL (ref 80–99)
MONOCYTES # BLD: 2.6 K/UL (ref 0–1)
MONOCYTES NFR BLD: 13 % (ref 5–13)
NEUTS SEG # BLD: 15.6 K/UL (ref 1.8–8)
NEUTS SEG NFR BLD: 79 % (ref 32–75)
NITRITE UR QL STRIP.AUTO: NEGATIVE
NRBC # BLD: 0 K/UL (ref 0–0.01)
NRBC BLD-RTO: 0 PER 100 WBC
PH UR STRIP: 6.5 [PH] (ref 5–8)
PLATELET # BLD AUTO: 221 K/UL (ref 150–400)
PMV BLD AUTO: 11.1 FL (ref 8.9–12.9)
POTASSIUM SERPL-SCNC: 3.8 MMOL/L (ref 3.5–5.1)
PROT SERPL-MCNC: 6.6 G/DL (ref 6.4–8.2)
PROT UR STRIP-MCNC: NEGATIVE MG/DL
PROTHROMBIN TIME: 10.9 SEC (ref 9–11.1)
Q-T INTERVAL, ECG07: 396 MS
QRS DURATION, ECG06: 84 MS
QTC CALCULATION (BEZET), ECG08: 427 MS
RBC # BLD AUTO: 3.66 M/UL (ref 3.8–5.2)
RBC MORPH BLD: ABNORMAL
SAMPLES BEING HELD,HOLD: NORMAL
SODIUM SERPL-SCNC: 138 MMOL/L (ref 136–145)
SOURCE, COVRS: NORMAL
SP GR UR REFRACTOMETRY: 1.02 (ref 1–1.03)
THERAPEUTIC RANGE,PTTT: NORMAL SECS (ref 58–77)
UROBILINOGEN UR QL STRIP.AUTO: 0.2 EU/DL (ref 0.2–1)
VENTRICULAR RATE, ECG03: 70 BPM
WBC # BLD AUTO: 19.8 K/UL (ref 3.6–11)

## 2022-01-01 PROCEDURE — HOSPICE MEDICATION HC HH HOSPICE MEDICATION

## 2022-01-01 PROCEDURE — 81003 URINALYSIS AUTO W/O SCOPE: CPT

## 2022-01-01 PROCEDURE — 0651 HSPC ROUTINE HOME CARE

## 2022-01-01 PROCEDURE — G0155 HHCP-SVS OF CSW,EA 15 MIN: HCPCS

## 2022-01-01 PROCEDURE — G0299 HHS/HOSPICE OF RN EA 15 MIN: HCPCS

## 2022-01-01 PROCEDURE — 3331090004 HSPC SERVICE INTENSITY ADD-ON

## 2022-01-01 PROCEDURE — 87635 SARS-COV-2 COVID-19 AMP PRB: CPT

## 2022-01-01 PROCEDURE — 99285 EMERGENCY DEPT VISIT HI MDM: CPT

## 2022-01-01 PROCEDURE — 71045 X-RAY EXAM CHEST 1 VIEW: CPT

## 2022-01-01 PROCEDURE — 74011000250 HC RX REV CODE- 250: Performed by: EMERGENCY MEDICINE

## 2022-01-01 PROCEDURE — 82272 OCCULT BLD FECES 1-3 TESTS: CPT

## 2022-01-01 PROCEDURE — 93005 ELECTROCARDIOGRAM TRACING: CPT

## 2022-01-01 PROCEDURE — 74011250636 HC RX REV CODE- 250/636: Performed by: EMERGENCY MEDICINE

## 2022-01-01 PROCEDURE — G0156 HHCP-SVS OF AIDE,EA 15 MIN: HCPCS

## 2022-01-01 PROCEDURE — 85610 PROTHROMBIN TIME: CPT

## 2022-01-01 PROCEDURE — 36415 COLL VENOUS BLD VENIPUNCTURE: CPT

## 2022-01-01 PROCEDURE — 85025 COMPLETE CBC W/AUTO DIFF WBC: CPT

## 2022-01-01 PROCEDURE — 80053 COMPREHEN METABOLIC PANEL: CPT

## 2022-01-01 PROCEDURE — 85730 THROMBOPLASTIN TIME PARTIAL: CPT

## 2022-01-01 PROCEDURE — 3336500001 HSPC ELECTION

## 2022-01-01 PROCEDURE — 96374 THER/PROPH/DIAG INJ IV PUSH: CPT

## 2022-01-01 RX ORDER — SODIUM CHLORIDE 0.9 % (FLUSH) 0.9 %
5-40 SYRINGE (ML) INJECTION AS NEEDED
Status: DISCONTINUED | OUTPATIENT
Start: 2022-01-01 | End: 2022-01-01

## 2022-01-01 RX ORDER — FUROSEMIDE 10 MG/ML
40 INJECTION INTRAMUSCULAR; INTRAVENOUS
Status: COMPLETED | OUTPATIENT
Start: 2022-01-01 | End: 2022-01-01

## 2022-01-01 RX ORDER — HYDROCODONE BITARTRATE AND ACETAMINOPHEN 5; 325 MG/1; MG/1
1 TABLET ORAL
Qty: 16 TABLET | Refills: 0 | Status: SHIPPED | OUTPATIENT
Start: 2022-01-01 | End: 2022-01-01

## 2022-01-01 RX ORDER — SODIUM CHLORIDE 0.9 % (FLUSH) 0.9 %
5-40 SYRINGE (ML) INJECTION EVERY 8 HOURS
Status: DISCONTINUED | OUTPATIENT
Start: 2022-01-01 | End: 2022-01-01 | Stop reason: HOSPADM

## 2022-01-01 RX ORDER — HYDROCODONE BITARTRATE AND ACETAMINOPHEN 5; 325 MG/1; MG/1
1 TABLET ORAL
Qty: 16 TABLET | Refills: 0 | Status: SHIPPED | OUTPATIENT
Start: 2022-01-01 | End: 2022-01-01 | Stop reason: SDUPTHER

## 2022-01-01 RX ADMIN — SODIUM CHLORIDE 1000 ML: 9 INJECTION, SOLUTION INTRAVENOUS at 12:44

## 2022-01-01 RX ADMIN — MORPHINE SULFATE 5 MG: 20 SOLUTION ORAL at 10:20

## 2022-01-01 RX ADMIN — FUROSEMIDE 40 MG: 10 INJECTION, SOLUTION INTRAVENOUS at 12:44

## 2022-01-01 RX ADMIN — Medication 10 ML: at 14:00

## 2022-02-11 NOTE — ED TRIAGE NOTES
Patient came from home here via EMS. Per family she was not acting normal and pt fsbs was 72. EMS started an IV and gave her D10 and it is now 235. Per EMS more alert after D10. Unsure of pt baseline at this time; will try to contact family. Pt vomited sometime this morning as patient presents with dried brown vomitus on her gown. Pt coughing; not productive.

## 2022-02-11 NOTE — HOSPICE
[facility]  [phone]    Nursing Note   Patient Name: Esther Howard  YOB: 1926  Age: 80 y.o. [facility] RN Note:     In to meet with patient and Starrene Foster/granddaughter/MPOA. Discussed Hospice philosophy, general plan of care, levels of care, services and on call procedures. Family information packet provided and reviewed. Plan is for home admission on Monday at [facility].  Daughter will speak with MD to get order for pain medications to take home with patient. Per [facility], patient has been having back pain for the last 3 weeks. Thank you for the opportunity to be of service to this patient and her family.

## 2022-02-11 NOTE — PROGRESS NOTES
Date of previous inpatient admission/ ED visit? 12/8/21-12/10/21 CVA    What brought the patient back to ED? AMS, low BS and vomited    Did patient decline recommended services during last admission/ ED visit (if yes, what)? No. Referral to [facility]    Has patient seen a provider since their last inpatient admission/ED visit (if yes, when)? PCP: First and Last name:  Patient to have new PCP appointment w/ Mario Peterson NP   Name of Practice:    Are you a current patient: Yes/No: new appointment scheduled first week of January (icy conditions) rescheduled 2/8/21 unable to drive to office bridges closed in travel next available 3/1/22   Approximate date of last visit:    CM Interventions:  From previous inpatient admission/ED visit: Assessment/ Interventions  From current inpatient admission/ED visit: SSED/CM consult received and appreciated. EMR reviewed. Family is requesting hospice no preference of agencies. Updates received. Met w/patient and granddaughter Rebekah Sandoval [phone] - introduced to role of CM. Patient is alert and history provided. Patient lives w/ family in 2 story home (bedroom is on main level / no SUDHA). Rl Sam has supportive family at home (granddaughter/ grand SNL/ great-granddaughter). Informed CM scheduled to work today and was requesting substitute if can be located to be available for medical team/ hospice. Informed CM great granddaughter Mohan Barnacle was to relive and come to hospital however now has stomach virus. DME includes stair lift/ [employer] (does not use) RW. Patient has had increased weakness and Starrene now having to pull and position out of bed. Had been able to take shuffling steps however after fall concerned about mobility. Informed of process w/ Hospice referral (review of EMR will need hospice diagnosis). Patient will need a hospital bed per granddaughter. [employer] and [employer] are insurance providers.      Hospice referral sent to [facility] hospice contact made w/ intake and JEAN Jackson estimate [phone] arrival.     Spoke back w/ Bernadette informed has coverage today at work and provided time of hospice session. No additional needs verbalized at this time. CM will follow for transitions of care needs.

## 2022-02-11 NOTE — HOSPICE
[facility]  [phone]     Patient Name: Jessi Ruiz  YOB: 1926  Age: 80 y.o. [address] RN Note:  Hospice consult received, reviewing chart. Will follow up with Unit Nurse and Care Manager to discuss plan of care, patient status and discharge disposition within the next 2 hours. Spoke with  Imelda Bhat. Plan to visit patient by 15:00 today. Thank you for the opportunity to be of service to this patient.     Asif Brown RN, [place] [address] Nurse Liaison  [phone] Mobile  [phone] Office  Available on [employer]

## 2022-02-11 NOTE — Clinical Note
[facility]  [phone]    Inpatient Nursing Admission   Patient Name: Abbey Cassidy  YOB: 1926  Age: 80 y.o. Date of Hospice Admission: 2/11/2022  Hospice Attending Elected by Patient: Mathew Whitley MD  Primary Care Physician: Michael Wooten MD  Admitting RN: ***  : ***     Level of Care (GIP/Routine/Respite): ***  Facility of Care: ***  Patient Room: [address]   ER Visits/ Hospitalizations in past year: ***  Hospice Diagnosis: No admission diagnoses are documented for this encounter. Onset Date of Hospice Diagnosis: ***  Summary of Disease Progression Leading to Hospice Diagnosis: ***    Co-Morbidities:   Patient Active Problem List   Diagnosis Code    CVA (cerebral vascular accident) ([facility] [address] I63.9     Diagnoses RELATED to the terminal prognosis: ***  Other Diagnoses: ***    Rationale for a prognosis of life expectancy of 6 months or less if the disease follows its normal course (Disease Specific History):     Abbey Cassidy is a 80 y. o. who was admitted to [facility]. The patient's principle diagnosis of *** has resulted in ***. Functionally, the patient's Palliative Performance Scale has declined over a period of *** and is estimated at ***. Objective information that support this patients limited prognosis includes: ***. The patient/family chose comfort measures with the support of Hospice. Patient meets for *** LOC as evidenced by ***    Prognosis estimated based on 02/11/22 clinical assessment is:   [] Few to Many Hours  [] Hours to Days   [] Few to Many Days   [] Days to Weeks   [] Few to Many Weeks   [] Weeks to Months   [] Few to Many Months    ASSESSMENT    Patient self-reports:  []  Yes    [] No    SYMPTOMS: ***    SIGNS/PHYSICAL FINDINGS: ***    [place]: ***    FAST for all dementia:      Learning Assessment:  Patient  Is patient willing/able to learn?   What is the highest level of education completed? Learning preference (written material, demonstration, visual)? Learning barriers (ESOL, [place], poor vision)? Caregiver  Is caregiver willing to learn care for patient? What is the highest level of education completed? Learning preference (written material, demonstration, visual)? Learning barriers (ESOL, [place], poor vision)? CLINICAL INFORMATION     Wt Readings from Last 3 Encounters:   02/11/22 99.5 kg (219 lb 5.7 oz)   12/09/21 99.5 kg (219 lb 5.7 oz)   10/27/17 101.5 kg (223 lb 11.2 oz)     Ht Readings from Last 3 Encounters:   02/11/22 5' (1.524 m)   12/09/21 4' 11\" (1.499 m)   10/27/17 5' (1.524 m)     Body mass index is 42.84 kg/m². Visit Vitals  BP (!) 96/57   Pulse 74   Temp 97.5 °F (36.4 °C)   Resp 15   Ht 5' (1.524 m)   Wt 99.5 kg (219 lb 5.7 oz)   SpO2 96%   BMI 42.84 kg/m²       LAB VALUES      CBC WITH AUTOMATED DIFF    Collection Time: 02/11/22 11:20 AM   Result Value Ref Range    WBC 19.8 (H) 3.6 - 11.0 K/uL    RBC 3.66 (L) 3.80 - 5.20 M/uL    HGB 11.6 11.5 - 16.0 g/dL    HCT 36.3 35.0 - 47.0 %    MCV 99.2 (H) 80.0 - 99.0 FL    MCH 31.7 26.0 - 34.0 PG    MCHC 32.0 30.0 - 36.5 g/dL    RDW 12.9 11.5 - 14.5 %    PLATELET 121 174 - 468 K/uL    MPV 11.1 8.9 - 12.9 FL    NRBC 0.0 0  WBC    ABSOLUTE NRBC 0.00 0.00 - 0.01 K/uL    NEUTROPHILS 79 (H) 32 - 75 %    LYMPHOCYTES 7 (L) 12 - 49 %    MONOCYTES 13 5 - 13 %    EOSINOPHILS 0 0 - 7 %    BASOPHILS 0 0 - 1 %    IMMATURE GRANULOCYTES 1 (H) 0.0 - 0.5 %    ABS. NEUTROPHILS 15.6 (H) 1.8 - 8.0 K/UL    ABS. LYMPHOCYTES 1.4 0.8 - 3.5 K/UL    ABS. MONOCYTES 2.6 (H) 0.0 - 1.0 K/UL    ABS. EOSINOPHILS 0.0 0.0 - 0.4 K/UL    ABS. BASOPHILS 0.0 0.0 - 0.1 K/UL    ABS. IMM. GRANS. 0.2 (H) 0.00 - 0.04 K/UL    DF SMEAR SCANNED      RBC COMMENTS MACROCYTOSIS  1+         No results found for this visit on 02/11/22 (from the past 6 hour(s)).   Lab Results   Component Value Date/Time    Protein, total 6.6 02/11/2022 11:20 AM    Albumin 2.9 (L) 2022 11:20 AM       Currently this patient has:  [] Supplemental O2 [] Peripheral IV  [] PICC    [] PORT   [] Alan Catheter [] NG Tube   [] PEG Tube [] Ostomy    [] AICD: Has ICD been deactivated? [] Yes [] No:______    PLAN     1. ***    Hospice Team Frequency Orders:  Skilled Nurse -  *** Daily x 7 days /every other day x 7 days *** with 5 PRN visits for symptom control. MSCELIA  1 visit for initial assessment/evaluation for family support and need for volunteer services. Wendi Luan  1 visit for initial assessment/evaluation for spiritual support. ADVANCE CARE PLANNING (Complete in ACP Flow Sheet)   Code Status: ***  Durable DNR: []  Yes  []  No  Code Status Discussed/Confirmed:  Preference for Other Life Sustaining Treatment Discussed/Confirmed:  Hospitalization Preference:  (ex. Patient would like to receive end of life care in the hospital, in a facility, at home etc.)  No flowsheet data found.  Service: [] Yes  []  No      [] Unknown  Appropriate for Pinning Ceremony:  [] Yes     [] No  Sabianism: NO PREFERENCE   Home: ***    DISCHARGE PLANNING     1. Discharge Plan: ***  2. Patient/Family teaching: ***  3. Response to patient/family teaching: ***    SOCIAL/EMOTIONAL/SPIRITUAL NEEDS     Spiritual Issues Identified: ***    Psych/ Social/ Emotional Issues Identified: ***    Caregiver Support:  [] Provided information on End of Life Care   [] Material Provided: Gone From My Sight or Bianca Sierras   Dr. Erick Jacome contacted, discharge to hospice order received  Dr. Erick Jacome contacted, agrees to serve as attending provider for hospice and provided verbal certification of terminal illness with life expectancy of 6 months or less. Orders for hospice admission, medications and plan of treatment received. Medication reconciliation completed.   MEDS: See medication list below  DME: Per hospital  Supplies: Per hospital  IDT communication to include MD, , SW, [address] [place] [address] and support team    ALLERGIES AND MEDICATIONS     Allergies: Allergies   Allergen Reactions    Hydrocodone Unknown (comments)     Current Facility-Administered Medications   Medication Dose Route Frequency    sodium chloride (NS) flush 5-40 mL  5-40 mL IntraVENous Q8H     Current Outpatient Medications   Medication Sig    aspirin delayed-release 81 mg tablet Take 1 Tablet by mouth daily.  amoxicillin-clavulanate (Augmentin) 875-125 mg per tablet Take 1 Tablet by mouth two (2) times a day.  cranberry fruit concentrate (Azo Cranberry) 250 mg chew Take  by mouth daily.  ramipriL (Altace) 10 mg capsule Take 10 mg by mouth daily.  potassium chloride SR (MICRO K 8) 8 mEq capsule Take 8 mEq by mouth two (2) times a day.  rivaroxaban (XARELTO) 15 mg tab tablet Take 15 mg by mouth daily.  mirabegron ER (Myrbetriq) 50 mg ER tablet Take 50 mg by mouth daily.  SITagliptin (Januvia) 50 mg tablet Take 50 mg by mouth daily.  sotaloL (BETAPACE) 80 mg tablet Take 40 mg by mouth daily.  insulin glargine (Lantus Solostar U-100 Insulin) 100 unit/mL (3 mL) inpn 30 Units by SubCUTAneous route daily. 0500 every am.    insulin aspart U-100 (NovoLOG Flexpen U-100 Insulin) 100 unit/mL (3 mL) inpn by SubCUTAneous route. Used on an emergency base only to help lower blood glucose.

## 2022-02-11 NOTE — SENIOR SERVICES NOTE
Senior Services Consult received and appreciated. Chart Reviewed. SSED team familiar with patient after recent visit on 12/8/21. Patient presents today for AMS, change in speech and GI hemorrhage. Patient and grand daughter, Jackie Torres greeted in room. Sj Cancino quickly remembered me from the last visit. She states that after that admission her grand mother has been a direct decline in overall health and not being able to perform any ADL's or IADL's independently, worsening over the last month.  -Sj Cancino states that she is very familiar with Hospice as she had her mother on Hospice prior to her passing with terminal cancer. She voices that her grand mother does not wish to be in the hospital and did not want to come today. However, Sj Cancino voiced that she is very overwhelmed with lack of proper equipment to care for grand mother and asks if Hospice is an option for her. When asked about preference for [address], she states that she will take recommendations.  -Patient lives with Sj Cancino and her family to include a grand daughter who is 23 yrs. And son in law. Sj Cancino states that if possible she wishes to do in home Hospice with support.   -Sj Cancino has dropped from full time work to part time to be able to provide additional support but her spouse works from home full time and she has a very supportive daughter. Order has been placed for Hospice Consult. I have collaborated findings with Dr. Chan Echevarria and Letcher Sever, CM to assist with disposition efforts. Thank you for the opportunity to participate in this patients care.      Noreen Orellana NP  Senior Services ED [place]  12:47 PM  [phone]

## 2022-02-11 NOTE — PROGRESS NOTES
Updates received - Hospice will admit at home on Monday. Stretcher transport will be needed home this evening. CM placed calls to providers [facility] and [facility](no available trips today). Additional calls placed to [facility] and [facility]. [facility] is available to  patient at 1830. Will need PCS and demographic faxed to [phone].     1750 Spoke w/granddaughter and provided updated transport time [phone] asked for hospice consent forms to be sent. 1810 Required documents faxed. CM received hospice signed consent forms  Will place along w/ PCS form.

## 2022-02-11 NOTE — ED PROVIDER NOTES
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 She was noted to have had a facial droop on the left. They were unable to get an MRI due to her pacemaker. She was placed on aspirin in addition to the Xarelto that she was on. She was to follow-up with speech therapy. She was also noted to have hypoglycemia and a UTI with some metabolic encephalopathy. She has a history of paroxysmal atrial flutter. She also has hypertension and type 2 diabetes. She was last known well last night and this morning apparently family found her with vomitus on her down in the left shoulder. She had a second episode of vomiting with a copious amount of dark material which appears to be blood-like and coffee-ground in appearance. Then the patient is on Xarelto. There is no history of any fever or other acute symptomatology leading up to this event this morning. Will attempt to get some additional history from family as we can. Patient does have garbled speech and is unable to provide any additional history. It sounds as though the patient had speech difficulties after her TIA or stroke in December and was supposed to follow-up with speech therapy. It is therefore unclear whether this patient's speech difficulty may is what she had when she left the hospital in December. Past Medical History:   Diagnosis Date    Diabetes ([address]     High cholesterol     Hypercholesteremia     Hypertension        Past Surgical History:   Procedure Laterality Date    HX HYSTERECTOMY      HX ORTHOPAEDIC      bilateral rods in femurs    HX PACEMAKER           No family history on file.     Social History     Socioeconomic History    Marital status:      Spouse name: Not on file    Number of children: Not on file    Years of education: Not on file    Highest education level: Not on file   Occupational History    Not on file   Tobacco Use    Smoking status: Never Smoker  Smokeless tobacco: Never Used   Substance and Sexual Activity    Alcohol use: No    Drug use: Not on file    Sexual activity: Not on file   Other Topics Concern    Not on file   Social History Narrative    Not on file     Social Determinants of Health     Financial Resource Strain:     Difficulty of Paying Living Expenses: Not on file   Food Insecurity:     Worried About Running Out of Food in the Last Year: Not on file    Jareth of Food in the Last Year: Not on file   Transportation Needs:     Lack of Transportation (Medical): Not on file    Lack of Transportation (Non-Medical): Not on file   Physical Activity:     Days of Exercise per Week: Not on file    Minutes of Exercise per Session: Not on file   Stress:     Feeling of Stress : Not on file   Social Connections:     Frequency of Communication with Friends and Family: Not on file    Frequency of Social Gatherings with Friends and Family: Not on file    Attends Druze Services: Not on file    Active Member of [address] [place] [address] or Organizations: Not on file    Attends Club or Organization Meetings: Not on file    Marital Status: Not on file   Intimate Partner Violence:     Fear of Current or Ex-Partner: Not on file    Emotionally Abused: Not on file    Physically Abused: Not on file    Sexually Abused: Not on file   Housing Stability:     Unable to Pay for Housing in the Last Year: Not on file    Number of [place] in the Last Year: Not on file    Unstable Housing in the Last Year: Not on file         ALLERGIES: Hydrocodone    Review of Systems   Reason unable to perform ROS: Patient is unable to provide any meaningful history based on her speech difficulty. She is arousable to verbal stimulus and will attempt to speak but makes no sense.        Vitals:    02/11/22 1026   Pulse: 77   Resp: 22   Temp: 97.5 °F (36.4 °C)   SpO2: 100%   Weight: 99.5 kg (219 lb 5.7 oz)   Height: 5' (1.524 m)            Physical Exam  Vitals and nursing note reviewed. Constitutional:       General: She is in acute distress ( Patient is lethargic but arousable and has a large amount of what appears to be bloody emesis over the entire left upper down shoulder. ). Appearance: She is well-developed. She is ill-appearing. She is not diaphoretic. Comments: This is a 80-year-old female with a prior history of TIA and speech difficulty December of last year. She is on blood thinners and presents for some altered mental status and 2 episodes of vomiting what appears to be bloody material.  Vital signs are as noted. HENT:      Head: Normocephalic and atraumatic. Nose: Nose normal.   Eyes:      General: No scleral icterus. Conjunctiva/sclera: Conjunctivae normal.      Pupils: Pupils are equal, round, and reactive to light. Neck:      Thyroid: No thyromegaly. Vascular: No JVD. Trachea: No tracheal deviation. Comments: No carotid bruits noted. Cardiovascular:      Rate and Rhythm: Normal rate and regular rhythm. Heart sounds: Normal heart sounds. No murmur heard. No friction rub. No gallop. Comments: Patient was noted by EMS to initially have more of an atrial fib or flutter. Subsequently she developed a more normal rhythm with bundle branch. A clear pacer spikes could not be seen. Pulmonary:      Effort: Pulmonary effort is normal. No respiratory distress. Breath sounds: Normal breath sounds. No stridor. No wheezing, rhonchi or rales. Chest:      Chest wall: No tenderness. Abdominal:      General: Bowel sounds are normal. There is no distension. Palpations: Abdomen is soft. There is no mass. Tenderness: There is no abdominal tenderness. There is no guarding or rebound. Musculoskeletal:         General: Swelling present. No tenderness. Normal range of motion. Cervical back: Normal range of motion and neck supple. Lymphadenopathy:      Cervical: No cervical adenopathy.    Skin:     General: Skin is warm and dry. Capillary Refill: Capillary refill takes more than 3 seconds. Coloration: Skin is pale. Findings: No erythema or rash. Neurological:      Mental Status: She is alert and oriented to person, place, and time. Cranial Nerves: Cranial nerve deficit and dysarthria present. Sensory: No sensory deficit. Motor: Weakness ( Generalized) present. Coordination: Coordination normal.      Deep Tendon Reflexes: Reflexes are normal and symmetric. Comments: The patient is arousable to verbal stimulus but will not follow commands. She attempts to speak but has garbled speech. No focal deficit other than what is noted above is noted. Psychiatric:         Behavior: Behavior normal.         Thought Content: Thought content normal.         Judgment: Judgment normal.      Comments: Unable to evaluate due to mental status change. MDM  Number of Diagnoses or Management Options     Amount and/or Complexity of Data Reviewed  Clinical lab tests: ordered and reviewed  Tests in the radiology section of CPT®: ordered and reviewed  Decide to obtain previous medical records or to obtain history from someone other than the patient: yes  Discuss the patient with other providers: yes  Independent visualization of images, tracings, or specimens: yes    Risk of Complications, Morbidity, and/or Mortality  Presenting problems: high  Diagnostic procedures: high  Management options: high    Patient Progress  Patient progress: stable         Procedures  This is a 51-year-old female who presents with altered mental status, garbled speech and apparently vomiting bloody material who is also on Xarelto and aspirin. She will open her eyes and attempt to speak but speech is garbled. She apparently does have DNR documents. Patient's pressure is in the low 100s. Will check labs and give fluids. Patient will likely require admission for GI bleed.     The patient's daughter present with her right now.  She said her slurred speech this morning was worse than what she had in December. It was noted that her sugar was somewhat low when EMS arrived at the home and gave her D10. Her sugar here is in the 200s. At this point the patient is able to speak without difficulty. She is clearly understood. The daughter states that the patient will sometimes speak in [place] which may be what she was speaking earlier. At this point, it does not appear that she has significant ischemic evidence. The daughter was unclear whether she wanted neuro interventional involved and states she feels just overwhelmed. The patient does have a DNR. I have asked [facility] services nurse practitioner to see and help walk the daughter through the options at this time. The patient does have somewhat of a cough and a little rattling in her chest.  The discoloration of vomitus on her gown is chocolate Glucerna. It did test negative for blood. The patient is hemodynamically stable with a slightly wet cough. Her sats are at 98% on oxygen. She does not appear to be in any acute distress currently. In speaking with the nurse practitioner [facility], the family has decided that they want in-home hospice care. The patient does not want to be admitted to the hospital.  It does not appear that she has any infectious process. Her chest x-ray shows mild degree of pulmonary edema. Otherwise her labs are nonacute. Still have not obtained any urine for urinalysis. If indicated will cover with some antibiotic. [facility] is now working on setting up home hospice care for this patient. No other acute interventions are indicated presently. 12:29 PM    1:58 PM awaiting the hospice team to arrive to interview the patient and family. The anticipation is that the patient will be discharged home on hospice care. She remained stable in the department. Hospice has completed their evaluation and will accept the patient into home care. Patient will therefore be discharged home to the care of hospice.

## 2022-02-12 NOTE — ROUTINE PROCESS
Patient left via transport to return home. Pt is alert and at her baseline mentation. Pt has no needs at this time and is stable for transport. Report given to EMS transport.

## 2022-02-14 NOTE — HOSPICE
Khloe Chandler  3/30/1946  89                                                         Principle Hospice Diagnosis: senile degeneration of the brain  Diagnoses RELATED to the terminal prognosis:   acute metabolic encephalopathy  left sided facial droop, possible CVA/TIA  Other Diagnoses:   Possible NSTEMI  Paroxysmal Atrial flutter  Diabetes   High cholesterol   Hypercholesteremia    Hypertension      Date of Hospice Admission: 2022  Hospice Attending Elected by Patient: Masood Lindsay  Primary Care Physician: n/a    Admitting RN: Boy Liz  Nurse CM: Dwight Thorne Worker: Charis Breen:    Ting Gorman DNR:  Yes          Home:    Hospice Summary: pt sitting on sofa in downstairs area where pts bedroom, bath, and kitchenette are located. pt lives with her granddaughter, grandson in law and great granddaughter who is 21years old. pt was chair bound as of last week but has been given hydrocodone for back pain and is now able to ambulate using walker. appetite poor. breath sounds clear. pt has congested cough. Robitussin ordered for delivery tomorrow. bowel sounds active. last BM . incontinent x 2. 3+ pitting edema bilateral lower extremities. ER Visits/ Hospitalizations in past year: 5-6    Onset Date of Hospice Diagnosis: 3-4 years ago    Summary of Disease Progression Leading to Hospice Diagnosis:    Patient and grand daughter, Janee Thompson greeted in room. Darling Mayo quickly remembered me from the last visit. She states that after that admission her grand mother has been a direct decline in overall health and not being able to perform any ADL's or IADL's independently, worsening over the last month.   -Darling Mayo states that she is very familiar with Hospice as she had her mother on Hospice prior to her passing with terminal cancer. She voices that her grand mother does not wish to be in the hospital and did not want to come today.  However, Darling Mayo voiced that she is very overwhelmed with lack of proper equipment to care for grand mother and asks if Hospice is an option for her. When asked about preference for [address], she states that she will take recommendations.   -Patient lives with Tj Zhu and her family to include a grand daughter who is 23 yrs. And son in law. Tj Zhu states that if possible she wishes to do in home Hospice with support.   -Tj Zhu has dropped from full time work to part time to be able to provide additional support but her spouse works from home full time and she has a very supportive daughter. Use LCD Guidelines and list features:     A. Clinical Status  ___X_____  1) Recurrent or intractable infections such as pneumonia, sepsis or upper urinary tract. 2) Progressive inanition as documented by:                        ________  a) Weight loss not due to reversible causes such as depression or use of diuretics                      ________  b) Decreasing anthropomorphic measurements (mid-arm circumference,                                                              abdominal girth), not due to reversible causes such as depression or use of                                                                diuretics                      ____x____  c) Decreasing serum albumin or cholesterol  ____x____  3) Dysphagia leading to recurrent aspiration and/or inadequate oral intake documented by                           decreasing food portion consumption. B. Symptoms  ________  1) Dyspnea with increasing respiratory rate  ________  2) Cough, intractable   ___x_____  3) Nausea/vomiting poorly responsive to treatment  ________  4) Diarrhea, intractable  ___X_____  5) Pain requiring increasing doses of major analgesics more than briefly.     C. Signs  ________  1) Decline in systolic blood pressure to below 90 or progressive postural hypotension  ________  2) Ascites  ________  3) Venous, arterial or lymphatic obstruction due to local progression or metastatic disease  ____X____  4) Edema  ________  5) Pleural / pericardial effusion  ____X____  6) Weakness  ___X_____  7) Change in level of consciousness    D. Laboratory (When available. Lab testing is not required to establish hospice eligibility.)  ________  1) Increasing pCO2 or decreasing pO2 or decreasing SaO2  ________  2) Increasing calcium, creatinine or liver function studies  ________  3) Increasing tumor markers (e.g. CEA, PSA)  ________  4) Progressively decreasing or increasing serum sodium or increasing serum potassium    2. Decline in Karnofsky Performance Status (KPS) or Palliative Performance Score (PPS) from <70% due to progression of disease. 3. Increasing emergency room visits, hospitalizations, or physician's visits related to hospice primary diagnosis    4. Progressive decline in Functional Assessment Staging (FAST) for dementia (from ? 7A on the FAST)    5. Progression to dependence on assistance with additional activities of daily living (See Part II, Section 2)    6. Progressive stage 3-4 pressure ulcers in spite of optimal care      Part II. Non-disease specific baseline guidelines   (both of these should be met)    ____X____  1. Physiologic impairment of functional status as demonstrated by Karnofsky Performance                           Status (KPS) or Palliative Performance Score (PPS) <70%. Note that two of the disease                            specific guidelines (HIV Disease, Stroke and Coma) establish a lower qualifying KPS or PPS. 2. Dependence on assistance for two or more activities of daily living (ADLs)  ___X_____  A. Feeding  ___X_____  B. Ambulation  ___X_____  C. Continence  ___X_____  D. Transfer  ___X_____  E. Bathing  ___X_____  F. Dressing    Additionally, disease specific guidelines should be used with these (Part II) baseline guidelines. The baseline guidelines do not independently qualify a patient for hospice coverage. Note:  The word should in the disease specific guidelines means that on medical review the guideline so identified will be given great weight in making a coverage determination. It does not mean, however, that meeting the guideline is obligatory. Part III. Co-morbidities   Although not the primary hospice diagnosis, the presence of disease such as the following, the severity of which is likely to contribute to a life expectancy of six months or less, should be considered in determining hospice eligibility.    ________  A. Chronic obstructive pulmonary disease  ________  B. Congestive heart failure  ________  C. Ischemic heart disease  ___X_____  D. Diabetes mellitus  ________  E. Neurologic disease (CVA, ALS, MS, Parkinson's)  ________  F. Renal failure  ________  G. Liver Disease  ________  H. Neoplasia  ________  I. Acquired immune deficiency syndrome  ___X_____  J. Dementia        SPIRITUAL/Social/Emotional: no Shinto according to granddaughter         Dr. Ellsworth______________ contacted, agrees to serve as attending provider for hospice and provided verbal certification of terminal illness with prognosis of 6 months or less life xpectancy. Orders for hospice admission, medications and plan of treatment received. Medication reconciliation completed.     Currently this patient has:    Supplemental O2:   yes        MEDS:  I have reviewed the patient's medication list with MD and identified the following:  Nonformulary medications: lantus, Januvia, altace, sotalol, eliquis  Unrelated medications: none    IDT communication to include MD, SN, SW, [address] [place] [address] and support team.

## 2022-02-15 NOTE — HOSPICE
Patient resting in bed with granddaughter, Chicho, present. Star reports patient's blood sugar was in the 30s according to [address] glucose  on patient's arm and has been giving honey. She reports checking patient's sugar at least every 3 hours during the day. Chicho was unable to obtain repeat blood sugar due to [address] falling off patient's arm. Star verbalizes that she holds patient's Lantus if her glucose is low. Patient turned and cleaned for incontinence care. Patient observed to be grimacing and moaning with repositioning. Chicho and RN both attempted to administer patient's Genevieve Davison but patient repeatedly spit out pill even when crushed in yogurt. Patient swallowed yogurt and honey without difficulty when pill was not present. [facility]K pending delivery this afternoon. RN educated Chicho on comfort meds and to try administering ordered morphine prior to repositioning. RN contacted MSW after visit to call Chicho Wednesday morning per Chicho's availibility to discuss resources for caregiving assistance. Supplies ordered through [facility]. Bed alarm ordered through [facility].

## 2022-02-16 NOTE — HOSPICE
LCSW placed call to granddaughter, Alecia Escobar and completed initial assessment via phone. Ms. Evelyne Baca is working some at home and some in her office. Pt lives with her and she serves as primary caregiver with the help of her  and 23yo daughter. Per Ms. Oliveira's description, pt is now fully bedbound in her hospital bed with railings to prevent falls. She has had increased pain in the last few days, new onset nausea/vomiting as of last night and is having difficulty swallowing. LCSW reviewed at length the signs and symptoms of decline as pt is exhibiting many signs that she may be approaching EOL in a short time. Ms. Evelyne Baca initiated [facility] meds last night and LCSW spoke with RN after conversation with Rambo Padilla to f/u regarding additional medications for n/v and ongoing pain/possible anxiety as pt has had a very difficult time sleeping. Ms. Evelyne Baca shared that pt is also seeing things like waterfalls and calling out for her mother. Ms. Evelyne Baca shared that both of her parents are  and both passed fairly quickly - her father in the hospital after a surgical issue and her mother in hospice after only 2 days while Ms. Evelyne Baca was out of town. LCSW reviewed potential caregiving options - pt has about $2300 in SSI that could assist with payment. Pt is a  and her  served in the [employer]. She may be eligible for VA benefits and LCSW also provided Ms. Oliveira with [facility] via e-mail: [email or web address] LCSW awaiting NCD to provide via [employer] at Ms. Oliveira's request. LCSW will remain available for ongoing support and resources. RN CM to call today to f/u and visit planned for tomorrow along with aide. Granddaughter plans to use [employer] for final arrangements.
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
## 2022-02-22 NOTE — HOSPICE
Patient received resting in bed with granddaughter, Chicho, and great granddaughter, Mallory Wang, present for visit. Patient is alert to voice and says a few words when spoken to with noted confusion. Patient appears comfortable at beginning of visit but moans and guards in pain when legs turned for incontinence care despite 5mg morphine given about 30-45 minutes prior to repositioning patient. Star reports giving 5mg morphine approximately every 4 hours the previous day but states the patient was still moaning at times when turned. Call placed to Dr. Marley Russell and orders received to increase morphine to 10mg every 3 hours to aid in pain relief. Star administered the additional 5mg to complete the dose of 10mg after order received. Patient tolerated wound care well. Supplemental O2 applied for comfort for nonverbal signs of mild SOB. Biscacodyl suppository administered for constipation. Star reports decrease in patient's PO intake in the last 3 days and patient was only taking in sips of water but had not had anything today yet. Star also reports that the patient appeared agitated yesterday. Medications and indications reviewed with Star. Supplies pending delivery via [facility]. Encouraged Star to call hospice 24/7 with any needs or concerns.   Morphine and biscaodyl refilled through [facility] #[id number]

## 2022-02-24 NOTE — HOSPICE
pt was called to inform her of time  , I was coming pt stated her grandmother was gargling and non responsive she also stated that a nurse was coming . I called alessia craft and asked to change her for me and informed chandu , he was in in route.  pt was changed,t no bath was given

## 2022-02-24 NOTE — HOSPICE
Pronouncement of death completed by: Karlee Perales RN. Agency staff was not present at the time of death. At the time of death the patient was documented as apneic, pulseless, blood pressure absent. The pt  within her home(location).  Post mortem care provided The following were notified of the patient's death: MD BRIDGER, Hospice team.  Medications were disposed of per hospice protocol Family appreciative for hospice service and support

## 2022-02-25 ENCOUNTER — HOME CARE VISIT (OUTPATIENT)
Dept: HOSPICE | Facility: HOSPICE | Age: 87
End: 2022-02-25
Payer: MEDICARE
